# Patient Record
Sex: FEMALE | Race: WHITE | NOT HISPANIC OR LATINO | Employment: PART TIME | URBAN - METROPOLITAN AREA
[De-identification: names, ages, dates, MRNs, and addresses within clinical notes are randomized per-mention and may not be internally consistent; named-entity substitution may affect disease eponyms.]

---

## 2017-06-28 ENCOUNTER — OFFICE VISIT (OUTPATIENT)
Dept: PEDIATRICS | Facility: CLINIC | Age: 37
End: 2017-06-28
Payer: COMMERCIAL

## 2017-06-28 VITALS
TEMPERATURE: 97.8 F | HEIGHT: 65 IN | BODY MASS INDEX: 40.14 KG/M2 | SYSTOLIC BLOOD PRESSURE: 116 MMHG | DIASTOLIC BLOOD PRESSURE: 76 MMHG | OXYGEN SATURATION: 97 % | HEART RATE: 87 BPM | WEIGHT: 240.9 LBS

## 2017-06-28 DIAGNOSIS — M21.41 FLAT FEET, BILATERAL: ICD-10-CM

## 2017-06-28 DIAGNOSIS — M79.672 LEFT FOOT PAIN: ICD-10-CM

## 2017-06-28 DIAGNOSIS — L60.3 NAIL DYSTROPHY: ICD-10-CM

## 2017-06-28 DIAGNOSIS — M21.42 FLAT FEET, BILATERAL: ICD-10-CM

## 2017-06-28 DIAGNOSIS — L60.0 INGROWN NAIL: ICD-10-CM

## 2017-06-28 DIAGNOSIS — N92.0 EXCESSIVE OR FREQUENT MENSTRUATION: Primary | ICD-10-CM

## 2017-06-28 PROCEDURE — 99214 OFFICE O/P EST MOD 30 MIN: CPT | Performed by: FAMILY MEDICINE

## 2017-06-28 RX ORDER — VENLAFAXINE HYDROCHLORIDE 150 MG/1
150 CAPSULE, EXTENDED RELEASE ORAL DAILY
Refills: 0 | COMMUNITY
Start: 2017-05-01 | End: 2022-10-06

## 2017-06-28 ASSESSMENT — ANXIETY QUESTIONNAIRES
7. FEELING AFRAID AS IF SOMETHING AWFUL MIGHT HAPPEN: NOT AT ALL
5. BEING SO RESTLESS THAT IT IS HARD TO SIT STILL: NOT AT ALL
1. FEELING NERVOUS, ANXIOUS, OR ON EDGE: SEVERAL DAYS
2. NOT BEING ABLE TO STOP OR CONTROL WORRYING: NOT AT ALL
GAD7 TOTAL SCORE: 2
3. WORRYING TOO MUCH ABOUT DIFFERENT THINGS: NOT AT ALL
6. BECOMING EASILY ANNOYED OR IRRITABLE: SEVERAL DAYS

## 2017-06-28 ASSESSMENT — PAIN SCALES - GENERAL: PAINLEVEL: MILD PAIN (2)

## 2017-06-28 ASSESSMENT — PATIENT HEALTH QUESTIONNAIRE - PHQ9: 5. POOR APPETITE OR OVEREATING: NOT AT ALL

## 2017-06-28 NOTE — NURSING NOTE
"Chief Complaint   Patient presents with     Toenail     Left great toenail fungal problem     Contraception     Patient c/o heavy periods and hormonal emotions on IUD     Musculoskeletal Problem     Left side of foot pain x3-4 weeks. No known injury.        Initial /76  Pulse 87  Temp 97.8  F (36.6  C) (Oral)  Ht 5' 4.75\" (1.645 m)  Wt 240 lb 14.4 oz (109.3 kg)  LMP 06/25/2017 (Exact Date)  SpO2 97%  Breastfeeding? No  BMI 40.4 kg/m2 Estimated body mass index is 40.4 kg/(m^2) as calculated from the following:    Height as of this encounter: 5' 4.75\" (1.645 m).    Weight as of this encounter: 240 lb 14.4 oz (109.3 kg).  BP completed using cuff size: sade Alfredo, CMA    "

## 2017-06-28 NOTE — MR AVS SNAPSHOT
After Visit Summary   6/28/2017    Evan Dominguez    MRN: [id number]           Patient Information     Date Of Birth          1980        Visit Information        Provider Department      6/28/2017 6:00 PM Pablo Mcdowell MD [facility]        Today's Diagnoses     Excessive or frequent menstruation    -  1    Left foot pain        Nail dystrophy        Ingrown nail          Care Instructions    USE FOOT ORTHOTICS FOR ARCHES  USE WARM FOOT SOAKS DAILY, BACITRACIN TWICE DAILY FOR 2 WEEKS  SCHEDULE FOR F/U WITH Gyn for changing to Mirena IUD  Call for podiatry consult if foot pain is not any better in 4-6 weeks    Ingrown Toenail, Not Infected (Home Treatment)  An ingrown toenail occurs when the nail grows sideways into the skin next to the nail. This can cause pain, especially when wearing tight shoes. It can also lead to an infection with redness, swelling, and pus drainage. Most people respond to the treatments described here. Sometimes surgery is needed, however.  Home care  The following guidelines will help you care for your toenail at home:    Soak the painful toe in warm water twice a day for 10 to 20 minutes each time. Wash the entire foot with an antibacterial soap.    If there is redness or swelling around the toenail, apply an antibiotic ointment three times a day.    Insert a small piece of rolled-up cotton under the corner of the nail to promote growth of the nail outward, away from the cuticle.    Wear shoes that do not put pressure on the toes, such as a sandal or open shoe. Closed shoes should be big enough in the toes so that there is no pressure on the painful toe.    You may use acetaminophen or ibuprofen for pain, unless another pain medicine was prescribed. Talk with your healthcare provider before using these medicines if you have chronic liver or kidney disease. Also tell your healthcare provider if you have ever had a stomach ulcer or GI  bleeding.  Prevention  The following tips will help you prevent ingrown toenails:    Avoid pointed, tight, or narrow shoes.    Trim toenails once a month so they don t grow too long. Cut the nail straight across.  Follow-up care  Follow up with your healthcare provider or as advised.  When to seek medical advice  Call your health care provider right away if any of these occur:    Increasing redness, pain, or swelling of the toe    Tender red streaks in the skin leading toward the ankle    Pus or fluid drainage from the toe    Fever of 100.4 F (38 C) or higher  Date Last Reviewed: 5/14/2015 2000-2017 [employer]. [address] [place] [address]. All rights reserved. This information is not intended as a substitute for professional medical care. Always follow your healthcare professional's instructions.                Follow-ups after your visit        Additional Services     PODIATRY/FOOT & ANKLE SURGERY REFERRAL       Your provider has referred you to: [facility]    Please be aware that coverage of these services is subject to the terms and limitations of your health insurance plan.  Call member services at your health plan with any benefit or coverage questions.      Please bring the following to your appointment:  >>   Any x-rays, CTs or MRIs which have been performed.  Contact the facility where they were done to arrange for  prior to your scheduled appointment.    >>   List of current medications   >>   This referral request   >>   Any documents/labs given to you for this referral                  Who to contact     If you have questions or need follow up information about today's clinic visit or your schedule please contact [facility] directly at [phone].  Normal or non-critical lab and imaging results will be communicated to you by MyChart, letter or phone within 4 business days after the clinic has received the results. If you do not hear from us within 7  "days, please contact the clinic through [employer] or phone. If you have a critical or abnormal lab result, we will notify you by phone as soon as possible.  Submit refill requests through [employer] or call your pharmacy and they will forward the refill request to us. Please allow 3 business days for your refill to be completed.          Additional Information About Your Visit        [employer] Information     [employer] is an electronic gateway that provides easy, online access to your medical records. With [employer], you can request a clinic appointment, read your test results, renew a prescription or communicate with your care team.     To sign up for [employer] visit the website at [email or web address]   You will be asked to enter the access code listed below, as well as some personal information. Please follow the directions to create your username and password.     Your access code is: [email or web address]  Expires: 2017 12:26 PM     Your access code will  in 90 days. If you need help or a new code, please contact your [facility] or call [phone] for assistance.        Care EveryWhere ID     This is your Care EveryWhere ID. This could be used by other organizations to access your [place] medical records  [id number]        Your Vitals Were     Pulse Temperature Height Last Period Pulse Oximetry Breastfeeding?    87 97.8  F (36.6  C) (Oral) 5' 4.75\" (1.645 m) 2017 (Exact Date) 97% No    BMI (Body Mass Index)                   40.4 kg/m2            Blood Pressure from Last 3 Encounters:   17 116/76   16 124/70   12/03/15 117/67    Weight from Last 3 Encounters:   17 240 lb 14.4 oz (109.3 kg)   16 234 lb 6.4 oz (106.3 kg)   12/03/15 236 lb 3.2 oz (107.1 kg)              We Performed the Following     PODIATRY/FOOT & ANKLE SURGERY REFERRAL        Primary Care Provider Office Phone # Fax #    Pablo Mcdowell [phone]       [facility] " [facility] [id number]  [facility] [address]        Equal Access to Services     GUANAKITOKANA SHAHZAD : Hadii edward ibarra hadananyao Soricardoali, waaxda luqadaha, qaybta kaalmada felipefreedom, waxtamika armin porterdina wangchris shepherd ankit . So [facility] [phone].    ATENCIÓN: Si habla español, tiene a rogers disposición servicios gratuitos de asistencia lingüística. Llame al [phone].    We comply with applicable federal civil rights laws and [place] laws. We do not discriminate on the basis of race, color, national origin, age, disability sex, sexual orientation or gender identity.            Thank you!     Thank you for choosing [facility]  for your care. Our goal is always to provide you with excellent care. Hearing back from our patients is one way we can continue to improve our services. Please take a few minutes to complete the written survey that you may receive in the mail after your visit with us. Thank you!             Your Updated Medication List - Protect others around you: Learn how to safely use, store and throw away your medicines at www.disposemymeds.org.          This list is accurate as of: 6/28/17  6:14 PM.  Always use your most recent med list.                   Brand Name Dispense Instructions for use Diagnosis    albuterol 108 (90 BASE) MCG/ACT Inhaler    PROAIR HFA/PROVENTIL HFA/VENTOLIN HFA    1 Inhaler    Inhale 2 puffs into the lungs every 6 hours as needed for shortness of breath / dyspnea.    Intermittent asthma       olopatadine HCl 0.2 % Soln    PATADAY    1 Bottle    Place 1 drop into both eyes daily as needed (For itchy, water eyes.)    Allergic conjunctivitis, bilateral       paragard intrauterine copper     1 each    One device, intrauterine, for up to 10 years.    Encounter for IUD insertion       venlafaxine 150 MG 24 hr capsule    EFFEXOR-XR     Take 150 mg by mouth daily

## 2017-06-28 NOTE — PROGRESS NOTES
SUBJECTIVE:                                                    Evan Dominguez is a 37 year old female who presents to clinic today for the following health issues:            Joint Pain  Patient seen with concerns of having pain along the outer border of left foot and the heel for the past few months. Has history of plantar fasciitis in the past.  She denies right-sided symptoms, works as a RN at the [employer]    Onset: left foot pain for few months    Description:   Location: Outer border of left foot and heel  Character: Dull ache    Intensity: mild, moderate    Progression of Symptoms: intermittent    Accompanying Signs & Symptoms:  Other symptoms: none    History:   Previous similar pain: no       Precipitating factors:   Trauma or overuse: no     Alleviating factors:  Improved by: nothing    Therapies Tried and outcome: none    FOOT NAIL DYSTROPHY-complaining of pain around the left big toe With the discolored left big toenail for the past 1-2 years, has tried over-the-counter medications with no relief patient denies to swelling, right-sided symptoms  Vaginal Bleeding (Dysmenorrhea)  Onset: Patient is  2 para 2, has been having paragard IUD since 2015 for contraception. She had 2 C-sections for children before.  Patient did not have any cycles while on breast-feeding after the IUD for at least one year. Has been having irregular menstrual cycles for the past 6-7 months with periods lasting for 7-10 days with very heavy bleeding including clots for the first 3 days of the cycle where she had to use a tampon and a pad  Every 1-2 hours.        Problem list and histories reviewed & adjusted, as indicated.  Additional history: as documented    Patient Active Problem List   Diagnosis     CARDIOVASCULAR SCREENING; LDL GOAL LESS THAN 160     Asthma, exercise induced     H/O kidney donation     Radial styloid tenosynovitis     Moderate major depression (H)     Obesity (BMI 30-39.9)      Meibomian gland dysfunction     Allergic conjunctivitis     Family history of diabetes mellitus     Previous  delivery, antepartum condition or complication     Asymptomatic bacteriuria in pregnancy in second trimester     Need for Tdap vaccination     Beta hemolytic streptococcus urinary tract infection affecting pregnancy     Excessive or frequent menstruation     Flat feet, bilateral     Ingrown nail     Nail dystrophy     Past Surgical History:   Procedure Laterality Date     C NEPHRECTOMY      LT/Donated Kidney to mother     C NONSPECIFIC PROCEDURE Right 1997    Ganglion Cyst/RT Wrist     C NONSPECIFIC PROCEDURE      laparoscopic gastric banding     C/SECTION, LOW TRANSVERSE  2013    , Low Transverse     LAPAROSCOPIC CHOLECYSTECTOMY  2014    Procedure: LAPAROSCOPIC CHOLECYSTECTOMY;  Surgeon: Cyril Hill MD;  Location:  OR       Social History   Substance Use Topics     Smoking status: Never Smoker     Smokeless tobacco: Never Used     Alcohol use Yes      Comment:  1 drink a month before pregancy     Family History   Problem Relation Age of Onset     DIABETES Mother      Pre Diabetic     Thyroid Disease Mother      Hypertension Father      DIABETES Maternal Grandmother      HEART DISEASE Maternal Grandfather      DIABETES Paternal Grandmother      HEART DISEASE Paternal Grandfather      CANCER No family hx of      CEREBROVASCULAR DISEASE No family hx of      Glaucoma No family hx of      Macular Degeneration No family hx of          Current Outpatient Prescriptions   Medication Sig Dispense Refill     venlafaxine (EFFEXOR-XR) 150 MG 24 hr capsule Take 150 mg by mouth daily  0     olopatadine HCl (PATADAY) 0.2 % SOLN Place 1 drop into both eyes daily as needed (For itchy, water eyes.) 1 Bottle 6     paragard intrauterine copper One device, intrauterine, for up to 10 years. 1 each      albuterol (PROAIR HFA, PROVENTIL HFA, VENTOLIN HFA) 108 (90 BASE) MCG/ACT inhaler  "Inhale 2 puffs into the lungs every 6 hours as needed for shortness of breath / dyspnea. 1 Inhaler 1     Allergies   Allergen Reactions     Contrast Dye      Congestion and sneezing with IVP dye     Thimerosol [Thimerosal]      Swelling and redness with eye drops     Recent Labs   Lab Test  05/17/15   1019  07/18/14   0800  07/16/14   2220  07/08/13   0919  03/08/11   1311   LDL   --    --    --   116   --    HDL   --    --    --   47*   --    TRIG   --    --    --   128   --    ALT   --   39  25   --   12   CR  0.71  0.87  0.90   --   0.81   GFRESTIMATED  >90  Non  GFR Calc    75  72   --   82   GFRESTBLACK  >90   GFR Calc    >90  87   --   >90   POTASSIUM  4.1  4.0  4.3   --   4.1   TSH  1.04   --    --   1.70  2.44      BP Readings from Last 3 Encounters:   06/28/17 116/76   09/29/16 124/70   12/03/15 117/67    Wt Readings from Last 3 Encounters:   06/28/17 240 lb 14.4 oz (109.3 kg)   09/29/16 234 lb 6.4 oz (106.3 kg)   12/03/15 236 lb 3.2 oz (107.1 kg)                  Labs reviewed in EPIC    Reviewed and updated as needed this visit by clinical staff  Tobacco  Allergies  Meds  Med Hx  Surg Hx  Fam Hx  Soc Hx      Reviewed and updated as needed this visit by Provider         ROS:  C: NEGATIVE for fever, chills, change in weight  INTEGUMENTARY/SKIN: as above  R: NEGATIVE for significant cough or SOB  CV: NEGATIVE for chest pain, palpitations or peripheral edema  GI: NEGATIVE for nausea, abdominal pain, heartburn, or change in bowel habits  -as above  MUSCULOSKELETAL: as above  NEURO: NEGATIVE for weakness, dizziness or paresthesias  ENDOCRINE: NEGATIVE for temperature intolerance, skin/hair changes  HEME/ALLERGY/IMMUNE: NEGATIVE for bleeding problems  PSYCHIATRIC: NEGATIVE for changes in mood or affect    OBJECTIVE:     /76  Pulse 87  Temp 97.8  F (36.6  C) (Oral)  Ht 5' 4.75\" (1.645 m)  Wt 240 lb 14.4 oz (109.3 kg)  LMP 06/25/2017 (Exact Date)  SpO2 97%  " Breastfeeding? No  BMI 40.4 kg/m2  Body mass index is 40.4 kg/(m^2).  GENERAL: healthy, alert and no distress  ABDOMEN: soft, nontender, no hepatosplenomegaly, no masses and bowel sounds normal  MS: normal gait, significant flatfeet-bilateral  Minimal tenderness over the left heel, full range of motion of the left foot  SKIN: ingrown toenail,No significant subungual hypertrophy or discoloration of nail  BACK: no CVA tenderness, no paralumbar tenderness  PSYCH: mentation appears normal, affect normal/bright    Diagnostic Test Results:  none     ASSESSMENT/PLAN:             1. Excessive or frequent menstruation  Reviewed her normal pelvic ultrasound from September 2016.   Likely her current heavy mensescould be from ParaGard IUD.   Recommended patient to follow-up with Dr. Rios to consider switching to Mirena IUD/Discussion on permanent contraception including essure.  Patient is not wishing for more children,  does not want to have vasectomy.  Patient verbalised understanding and is agreeable to the plan.      2. Left foot pain  ddx- plantar fasciitis/foot tendinitis/From flat feet  Recommended  local ice and heat, avoid triggering activities, Recommended to start using over-the-counter foot orthotics, use good comfortable footwear tylenol or Ibuprofen prn for pain, Call to schedule for podiatry consult if symptoms are not relieved with conservative treatment in 4-6 weeks  Patient verbalised understanding and is agreeable to the plan.  - PODIATRY/FOOT & ANKLE SURGERY REFERRAL    3. Nail dystrophy  Reassured patient, continue to monitor  - PODIATRY/FOOT & ANKLE SURGERY REFERRAL    4. Ingrown nail    Soak tid.  Bacitracin to affected area tid.  If any worsening sx, fever, etc. should be seen.  Allow nail to grow out and cut straight across.    - PODIATRY/FOOT & ANKLE SURGERY REFERRAL    5. Flat feet, bilateral  as above        Chart documentation done in part with Dragon Voice recognition Software. Although  reviewed after completion, some word and grammatical error may remain.    See Patient Instructions    Pablo Mcdowell MD  [facility]

## 2017-06-28 NOTE — PATIENT INSTRUCTIONS
USE FOOT ORTHOTICS FOR ARCHES  USE WARM FOOT SOAKS DAILY, BACITRACIN TWICE DAILY FOR 2 WEEKS  SCHEDULE FOR F/U WITH Gyn for changing to Mirena IUD  Call for podiatry consult if foot pain is not any better in 4-6 weeks    Ingrown Toenail, Not Infected (Home Treatment)  An ingrown toenail occurs when the nail grows sideways into the skin next to the nail. This can cause pain, especially when wearing tight shoes. It can also lead to an infection with redness, swelling, and pus drainage. Most people respond to the treatments described here. Sometimes surgery is needed, however.  Home care  The following guidelines will help you care for your toenail at home:    Soak the painful toe in warm water twice a day for 10 to 20 minutes each time. Wash the entire foot with an antibacterial soap.    If there is redness or swelling around the toenail, apply an antibiotic ointment three times a day.    Insert a small piece of rolled-up cotton under the corner of the nail to promote growth of the nail outward, away from the cuticle.    Wear shoes that do not put pressure on the toes, such as a sandal or open shoe. Closed shoes should be big enough in the toes so that there is no pressure on the painful toe.    You may use acetaminophen or ibuprofen for pain, unless another pain medicine was prescribed. Talk with your healthcare provider before using these medicines if you have chronic liver or kidney disease. Also tell your healthcare provider if you have ever had a stomach ulcer or GI bleeding.  Prevention  The following tips will help you prevent ingrown toenails:    Avoid pointed, tight, or narrow shoes.    Trim toenails once a month so they don t grow too long. Cut the nail straight across.  Follow-up care  Follow up with your healthcare provider or as advised.  When to seek medical advice  Call your health care provider right away if any of these occur:    Increasing redness, pain, or swelling of the toe    Tender red streaks  in the skin leading toward the ankle    Pus or fluid drainage from the toe    Fever of 100.4 F (38 C) or higher  Date Last Reviewed: 5/14/2015 2000-2017 [employer]. [address] [place] [address], [place] [address]. All rights reserved. This information is not intended as a substitute for professional medical care. Always follow your healthcare professional's instructions.

## 2017-06-29 ASSESSMENT — ANXIETY QUESTIONNAIRES: GAD7 TOTAL SCORE: 2

## 2017-06-29 ASSESSMENT — PATIENT HEALTH QUESTIONNAIRE - PHQ9: SUM OF ALL RESPONSES TO PHQ QUESTIONS 1-9: 4

## 2017-06-29 ASSESSMENT — ASTHMA QUESTIONNAIRES: ACT_TOTALSCORE: 25

## 2017-08-29 ENCOUNTER — OFFICE VISIT (OUTPATIENT)
Dept: PODIATRY | Facility: CLINIC | Age: 37
End: 2017-08-29
Attending: FAMILY MEDICINE
Payer: COMMERCIAL

## 2017-08-29 VITALS — HEART RATE: 84 BPM | OXYGEN SATURATION: 97 % | SYSTOLIC BLOOD PRESSURE: 116 MMHG | DIASTOLIC BLOOD PRESSURE: 83 MMHG

## 2017-08-29 DIAGNOSIS — B35.1 DERMATOPHYTOSIS OF NAIL: Primary | ICD-10-CM

## 2017-08-29 DIAGNOSIS — M72.2 PLANTAR FASCIITIS OF LEFT FOOT: ICD-10-CM

## 2017-08-29 DIAGNOSIS — B35.3 TINEA PEDIS OF BOTH FEET: ICD-10-CM

## 2017-08-29 PROCEDURE — 99203 OFFICE O/P NEW LOW 30 MIN: CPT | Performed by: PODIATRIST

## 2017-08-29 RX ORDER — NYSTATIN AND TRIAMCINOLONE ACETONIDE 100000; 1 [USP'U]/G; MG/G
CREAM TOPICAL 2 TIMES DAILY
Qty: 60 G | Refills: 4 | Status: SHIPPED | OUTPATIENT
Start: 2017-08-29 | End: 2018-06-06

## 2017-08-29 NOTE — PROGRESS NOTES
Past Medical History:   Diagnosis Date     Abnormal Pap smear     resolved     Asthma     Mild;  extreme cold or heavy exercises     Asymptomatic bacteriuria in pregnancy in second trimester 2015    Beta strep isolated.      Depression      Migraine headache      Postpartum hypertension     no magnesium sulfate used.       Solitary kidney     S/P Donated, Left     Patient Active Problem List   Diagnosis     CARDIOVASCULAR SCREENING; LDL GOAL LESS THAN 160     Asthma, exercise induced     H/O kidney donation     Radial styloid tenosynovitis     Moderate major depression (H)     Obesity (BMI 30-39.9)     Meibomian gland dysfunction     Allergic conjunctivitis     Family history of diabetes mellitus     Previous  delivery, antepartum condition or complication     Asymptomatic bacteriuria in pregnancy in second trimester     Need for Tdap vaccination     Beta hemolytic streptococcus urinary tract infection affecting pregnancy     Excessive or frequent menstruation     Flat feet, bilateral     Ingrown nail     Nail dystrophy     Past Surgical History:   Procedure Laterality Date     C NEPHRECTOMY      LT/Donated Kidney to mother     C NONSPECIFIC PROCEDURE Right     Ganglion Cyst/RT Wrist     C NONSPECIFIC PROCEDURE      laparoscopic gastric banding     C/SECTION, LOW TRANSVERSE  2013    , Low Transverse     LAPAROSCOPIC CHOLECYSTECTOMY  2014    Procedure: LAPAROSCOPIC CHOLECYSTECTOMY;  Surgeon: Cyril Hill MD;  Location:  OR     Social History     Social History     Marital status:      Spouse name: Jeromy Mchugh     Number of children: 01     Years of education: 16     Occupational History     [employer]     Social History Main Topics     Smoking status: Never Smoker     Smokeless tobacco: Never Used     Alcohol use Yes      Comment:  1 drink a month before pregancy     Drug use: No     Sexual activity: Yes     Partners:  Male     Other Topics Concern      Service No     Blood Transfusions No     Caffeine Concern No     Occupational Exposure No     Hobby Hazards No     Sleep Concern No     Stress Concern No     Weight Concern Yes     Special Diet No     Back Care No     Exercise Yes     Bike Helmet Yes     Seat Belt Yes     Self-Exams Yes     Social History Narrative     Family History   Problem Relation Age of Onset     DIABETES Mother      Pre Diabetic     Thyroid Disease Mother      Hypertension Father      DIABETES Maternal Grandmother      HEART DISEASE Maternal Grandfather      DIABETES Paternal Grandmother      HEART DISEASE Paternal Grandfather      CANCER No family hx of      CEREBROVASCULAR DISEASE No family hx of      Glaucoma No family hx of      Macular Degeneration No family hx of      SUBJECTIVE FINDINGS:  37-year-old female presents from Dr. Mcdowell for left hallux nail.  She relates it has been bothering her for about a year.  She relates she used over-the-counter antifungal cream that did not help.  She saw her primary physician who thought maybe it was not fungal but bacterial.  She soaked it in antibiotic soap and used bacitracin, and that did not really help.  Relates she had some debris under it so she trimmed it out herself.  Relates it hurts intermittently if she bumps it. She also relates she has plantar fasciitis in the left foot.  She has used over-the-counter insoles, she does stretching, she has a roller and she has a night splint.  This helps, but she is getting shooting pain up the lateral heel now.  She does work on her feet.  Relates no specific injuries.      OBJECTIVE FINDINGS:  DP and PT are 2/4 bilaterally.   She has a flat foot type bilaterally.  She has dorsally contracted digits 2-5 bilaterally.  She has a lateral deviated hallux and dorsal medial first MPJ prominence bilaterally.  She has dry, scaly skin on the distal toes mostly but in a moccasin pattern bilaterally.  She has  hyperkeratotic tissue buildup plantar medial hallux bilaterally and heels bilaterally.  There is no erythema, no drainage, no odor, no calor bilaterally.  No gross tendon voids bilaterally.  She has pain on palpation of plantar left heel.  She relates she gets shooting pain up the lateral heel at times.  She has a left hallux nail.  The medial nail border has been removed.  There is some proximal nail remaining that is thick and discolored.  There is scaly, cracked skin around the nail and distal toes bilaterally.      ASSESSMENT/PLAN:  Tinea pedis bilaterally.  She has onychomycosis changes on medial nail border on the left.  Plantar fasciitis with neuritis left.  Diagnosis and treatment options discussed with patient.  Prescription for nystatin and triamcinolone cream given and use discussed with her.  She is given a prescription for custom-foot orthotics and use discussed with her.  She wanted to check insurance coverage on those.  Prescription for Voltaren gel given and use discussed with her.  She will return to clinic and see me in 2 weeks.

## 2017-08-29 NOTE — NURSING NOTE
"Evan Dominguez's goals for this visit include: Ingrown toenail left foot and plantar fasciitis  She requests these members of her care team be copied on today's visit information: yes    PCP: Pablo Mcdowell    Referring Provider:  Pablo Mcdowell MD  [address] [place] [address]    Chief Complaint   Patient presents with     RECHECK     Ingrown toenail left and plantar fasciitis       Initial /83  Pulse 84  SpO2 97% Estimated body mass index is 40.4 kg/(m^2) as calculated from the following:    Height as of 6/28/17: 1.645 m (5' 4.75\").    Weight as of 6/28/17: 109.3 kg (240 lb 14.4 oz).  Medication Reconciliation: complete    "

## 2017-08-29 NOTE — MR AVS SNAPSHOT
After Visit Summary   8/29/2017    Evan Dominguez    MRN: [id number]           Patient Information     Date Of Birth          1980        Visit Information        Provider Department      8/29/2017 8:00 AM Sid Mckenna DPM [facility]        Today's Diagnoses     Dermatophytosis of nail    -  1    Tinea pedis of both feet        Plantar fasciitis of left foot          Care Instructions    Thanks for coming today.  Ortho/Sports Medicine Clinic  [address] [place] [address]    To schedule future appointments in Ortho Clinic, you may call [phone].    To schedule ordered imaging by your Provider: Call [facility] at [phone]    [employer] available online at:   [email or web address]    Please call if any further questions or concerns [phone] and ask for the Orthopedic Department. Clinic hours 8 am to 5 pm.    Return to clinic if symptoms worsen.            Follow-ups after your visit        Additional Services     ORTHOTICS REFERRAL       *This referral order prints off in the [facility]  (Orthotics & Prosthetics) Central Scheduling Office**    The [facility] Central Scheduling Staff will contact the patient to schedule appointments.     Central Scheduling Contact Information: [phone] ([place])    Custom foot orthotics.      Please be aware that coverage of these services is subject to the terms and limitations of your health insurance plan.  Call member services at your health plan with any benefit or coverage questions.      Please bring the following to your appointment:    >>   Any x-rays, CTs or MRIs which have been performed.  Contact the facility where they were done to arrange for  prior to your scheduled appointment.    >>   List of current medications   >>   This referral request   >>   Any documents/labs given to you for this referral                  Your next 10 appointments already  scheduled     Sep 12, 2017  9:00 AM CDT   Return Visit with Sid Mckenna DPM   [facility] ([facility])    [address] [place] [address] [phone]              Who to contact     If you have questions or need follow up information about today's clinic visit or your schedule please contact [facility] directly at [phone].  Normal or non-critical lab and imaging results will be communicated to you by Dripplerhart, letter or phone within 4 business days after the clinic has received the results. If you do not hear from us within 7 days, please contact the clinic through Dripplerhart or phone. If you have a critical or abnormal lab result, we will notify you by phone as soon as possible.  Submit refill requests through Tagstr or call your pharmacy and they will forward the refill request to us. Please allow 3 business days for your refill to be completed.          Additional Information About Your Visit        Tagstr Information     Tagstr is an electronic gateway that provides easy, online access to your medical records. With Tagstr, you can request a clinic appointment, read your test results, renew a prescription or communicate with your care team.     To sign up for Tagstr visit the website at [email or web address]   You will be asked to enter the access code listed below, as well as some personal information. Please follow the directions to create your username and password.     Your access code is: [email or web address]  Expires: 2017 11:53 AM     Your access code will  in 90 days. If you need help or a new code, please contact your [facility] or call [phone] for assistance.        Care EveryWhere ID     This is your Care EveryWhere ID. This could be used by other organizations to access your [facility] medical records  [id number]        Your Vitals Were     Pulse Pulse Oximetry                 84 97%           Blood Pressure from Last 3 Encounters:   08/29/17 116/83   06/28/17 116/76   09/29/16 124/70    Weight from Last 3 Encounters:   06/28/17 109.3 kg (240 lb 14.4 oz)   09/29/16 106.3 kg (234 lb 6.4 oz)   12/03/15 107.1 kg (236 lb 3.2 oz)              We Performed the Following     ORTHOTICS REFERRAL          Today's Medication Changes          These changes are accurate as of: 8/29/17 11:53 AM.  If you have any questions, ask your nurse or doctor.               Start taking these medicines.        Dose/Directions    diclofenac 1 % Gel topical gel   Commonly known as:  VOLTAREN   Used for:  Plantar fasciitis of left foot        Apply 1 gram to left heel 2-3 times daily as needed.   Quantity:  100 g   Refills:  1       nystatin-triamcinolone cream   Commonly known as:  MYCOLOG II   Used for:  Dermatophytosis of nail, Tinea pedis of both feet        Apply topically 2 times daily To feet and toes.   Quantity:  60 g   Refills:  4            Where to get your medicines      These medications were sent to [place] [address] [place] [id number] [facility] [address]     Phone:  [phone]     diclofenac 1 % Gel topical gel    nystatin-triamcinolone cream                Primary Care Provider Office Phone # Fax #    Pablo Mcdowell [phone]       [address]  [facility] [address]        Equal Access to Services     [facility] Hospital[facility] AH: Hadii edward ibarra hadasho Soomaali, waaxda luqadaha, qaybta kaalmada adeegyada, gregory pimentel. So [facility] [phone].    ATENCIÓN: Si shilpala katelyn, tiene a rogers disposición servicios gratuitos de asistencia lingüística. Llame al [phone].    We comply with applicable federal civil rights laws and [place] laws. We do not discriminate on the basis of race, color, national origin, age, disability sex, sexual orientation or gender identity.            Thank you!     Thank you for choosing [facility]  for your care. Our goal is always to provide you with excellent care. Hearing back from our patients is one way we can continue to improve our services. Please take a few minutes to complete the written survey that you may receive in the mail after your visit with us. Thank you!             Your Updated Medication List - Protect others around you: Learn how to safely use, store and throw away your medicines at www.disposemymeds.org.          This list is accurate as of: 8/29/17 11:53 AM.  Always use your most recent med list.                   Brand Name Dispense Instructions for use Diagnosis    albuterol 108 (90 BASE) MCG/ACT Inhaler    PROAIR HFA/PROVENTIL HFA/VENTOLIN HFA    1 Inhaler    Inhale 2 puffs into the lungs every 6 hours as needed for shortness of breath / dyspnea.    Intermittent asthma       diclofenac 1 % Gel topical gel    VOLTAREN    100 g    Apply 1 gram to left heel 2-3 times daily as needed.    Plantar fasciitis of left foot       nystatin-triamcinolone cream    MYCOLOG II    60 g    Apply topically 2 times daily To feet and toes.    Dermatophytosis of nail, Tinea pedis of both feet       olopatadine HCl 0.2 % Soln    PATADAY    1 Bottle    Place 1 drop into both eyes daily as needed (For itchy, water eyes.)    Allergic conjunctivitis, bilateral       paragard intrauterine copper     1 each    One device, intrauterine, for up to 10 years.    Encounter for IUD insertion       venlafaxine 150 MG 24 hr capsule    EFFEXOR-XR     Take 150 mg by mouth daily

## 2017-08-29 NOTE — PATIENT INSTRUCTIONS
Thanks for coming today.  Ortho/Sports Medicine Clinic  [address] [place] [address]    To schedule future appointments in Ortho Clinic, you may call [phone].    To schedule ordered imaging by your Provider: Call [facility] at [phone]    joiz available online at:   [email or web address]    Please call if any further questions or concerns [phone] and ask for the Orthopedic Department. Clinic hours 8 am to 5 pm.    Return to clinic if symptoms worsen.

## 2017-09-12 ENCOUNTER — OFFICE VISIT (OUTPATIENT)
Dept: PODIATRY | Facility: CLINIC | Age: 37
End: 2017-09-12
Payer: COMMERCIAL

## 2017-09-12 VITALS — DIASTOLIC BLOOD PRESSURE: 74 MMHG | HEART RATE: 95 BPM | SYSTOLIC BLOOD PRESSURE: 118 MMHG | OXYGEN SATURATION: 98 %

## 2017-09-12 DIAGNOSIS — B35.1 DERMATOPHYTOSIS OF NAIL: ICD-10-CM

## 2017-09-12 DIAGNOSIS — B35.3 TINEA PEDIS OF BOTH FEET: Primary | ICD-10-CM

## 2017-09-12 PROCEDURE — 99213 OFFICE O/P EST LOW 20 MIN: CPT | Performed by: PODIATRIST

## 2017-09-12 RX ORDER — CICLOPIROX OLAMINE 7.7 MG/G
CREAM TOPICAL 2 TIMES DAILY
Qty: 90 G | Refills: 6 | Status: SHIPPED | OUTPATIENT
Start: 2017-09-12 | End: 2020-07-29

## 2017-09-12 NOTE — PATIENT INSTRUCTIONS
Thanks for coming today.  Ortho/Sports Medicine Clinic  [address] [place] [address]    To schedule future appointments in Ortho Clinic, you may call [phone].    To schedule ordered imaging by your Provider: Call [facility] at [phone]    [employer] available online at:   [email or web address]    Please call if any further questions or concerns [phone] and ask for the Orthopedic Department. Clinic hours 8 am to 5 pm.    Return to clinic if symptoms worsen.

## 2017-09-12 NOTE — MR AVS SNAPSHOT
After Visit Summary   9/12/2017    Evan Dominguez    MRN: [id number]           Patient Information     Date Of Birth          1980        Visit Information        Provider Department      9/12/2017 9:00 AM Sid Mckenna DPM [facility]        Today's Diagnoses     Tinea pedis of both feet    -  1    Dermatophytosis of nail          Care Instructions    Thanks for coming today.  Ortho/Sports Medicine Clinic  [address] [place] [address]    To schedule future appointments in Ortho Clinic, you may call [phone].    To schedule ordered imaging by your Provider: Call [facility] at [phone]    Yangaroo available online at:   [email or web address]    Please call if any further questions or concerns [phone] and ask for the Orthopedic Department. Clinic hours 8 am to 5 pm.    Return to clinic if symptoms worsen.            Follow-ups after your visit        Who to contact     If you have questions or need follow up information about today's clinic visit or your schedule please contact [facility] directly at [phone].  Normal or non-critical lab and imaging results will be communicated to you by Rx Networkhart, letter or phone within 4 business days after the clinic has received the results. If you do not hear from us within 7 days, please contact the clinic through [email or web address] or phone. If you have a critical or abnormal lab result, we will notify you by phone as soon as possible.  Submit refill requests through Yangaroo or call your pharmacy and they will forward the refill request to us. Please allow 3 business days for your refill to be completed.          Additional Information About Your Visit        MyChart Information     Yangaroo is an electronic gateway that provides easy, online access to your medical records. With Yangaroo, you can request a clinic appointment, read your test results, renew a prescription or communicate with  your care team.     To sign up for MyChart visit the website at [email or web address]   You will be asked to enter the access code listed below, as well as some personal information. Please follow the directions to create your username and password.     Your access code is: [email or web address]  Expires: 2017 11:53 AM     Your access code will  in 90 days. If you need help or a new code, please contact your [facility] or call [phone] for assistance.        Care EveryWhere ID     This is your Care EveryWhere ID. This could be used by other organizations to access your [facility] medical records  [id number]        Your Vitals Were     Pulse Pulse Oximetry                95 98%           Blood Pressure from Last 3 Encounters:   17 118/74   17 116/83   17 116/76    Weight from Last 3 Encounters:   17 109.3 kg (240 lb 14.4 oz)   16 106.3 kg (234 lb 6.4 oz)   12/03/15 107.1 kg (236 lb 3.2 oz)              Today, you had the following     No orders found for display         Today's Medication Changes          These changes are accurate as of: 17 11:59 PM.  If you have any questions, ask your nurse or doctor.               Start taking these medicines.        Dose/Directions    ciclopirox 0.77 % cream   Commonly known as:  LOPROX   Used for:  Tinea pedis of both feet, Dermatophytosis of nail        Apply topically 2 times daily To feet and affected toenail.   Quantity:  90 g   Refills:  6            Where to get your medicines      These medications were sent to [place] [address] [place] [facility] [address]  [facility] [address]     Phone:  [phone]     ciclopirox 0.77 % cream                Primary Care Provider Office Phone # Fax #    Pablo Mcdoewll [phone]       [address]  [facility] [address]        Equal Access to Services     ERIC PIKE AH: Estefania Cisneros  waaxda luqadaha, qaybta kaalmada jennifer, gregory cartagenaaaidna ah. So [facility] [phone].    ATENCIÓN: Si akin zepeda, tiene a rogers disposición servicios gratuitos de asistencia lingüística. Nimco al [phone].    We comply with applicable federal civil rights laws and [place] laws. We do not discriminate on the basis of race, color, national origin, age, disability sex, sexual orientation or gender identity.            Thank you!     Thank you for choosing [facility]  for your care. Our goal is always to provide you with excellent care. Hearing back from our patients is one way we can continue to improve our services. Please take a few minutes to complete the written survey that you may receive in the mail after your visit with us. Thank you!             Your Updated Medication List - Protect others around you: Learn how to safely use, store and throw away your medicines at www.disposemymeds.org.          This list is accurate as of: 9/12/17 11:59 PM.  Always use your most recent med list.                   Brand Name Dispense Instructions for use Diagnosis    albuterol 108 (90 BASE) MCG/ACT Inhaler    PROAIR HFA/PROVENTIL HFA/VENTOLIN HFA    1 Inhaler    Inhale 2 puffs into the lungs every 6 hours as needed for shortness of breath / dyspnea.    Intermittent asthma       ciclopirox 0.77 % cream    LOPROX    90 g    Apply topically 2 times daily To feet and affected toenail.    Tinea pedis of both feet, Dermatophytosis of nail       diclofenac 1 % Gel topical gel    VOLTAREN    100 g    Apply 1 gram to left heel 2-3 times daily as needed.    Plantar fasciitis of left foot       nystatin-triamcinolone cream    MYCOLOG II    60 g    Apply topically 2 times daily To feet and toes.    Dermatophytosis of nail, Tinea pedis of both feet       olopatadine HCl 0.2 % Soln    PATADAY    1 Bottle    Place 1 drop into both eyes daily as needed (For itchy, water eyes.)    Allergic  conjunctivitis, bilateral       paragard intrauterine copper     1 each    One device, intrauterine, for up to 10 years.    Encounter for IUD insertion       venlafaxine 150 MG 24 hr capsule    EFFEXOR-XR     Take 150 mg by mouth daily

## 2017-09-12 NOTE — NURSING NOTE
"Evan Dominguez's goals for this visit include: Ingrown toenail and foot pain check   She requests these members of her care team be copied on today's visit information: yes    PCP: Pablo Mcdowell    Referring Provider:  Pablo Mcdowell MD  [address] [place] [address]    Chief Complaint   Patient presents with     RECHECK     Ingrown Toenail       Initial /74  Pulse 95  SpO2 98% Estimated body mass index is 40.4 kg/(m^2) as calculated from the following:    Height as of 6/28/17: 1.645 m (5' 4.75\").    Weight as of 6/28/17: 109.3 kg (240 lb 14.4 oz).  Medication Reconciliation: complete    "

## 2017-09-12 NOTE — PROGRESS NOTES
Past Medical History:   Diagnosis Date     Abnormal Pap smear     resolved     Asthma     Mild;  extreme cold or heavy exercises     Asymptomatic bacteriuria in pregnancy in second trimester 2015    Beta strep isolated.      Depression      Migraine headache      Postpartum hypertension     no magnesium sulfate used.       Solitary kidney     S/P Donated, Left     Patient Active Problem List   Diagnosis     CARDIOVASCULAR SCREENING; LDL GOAL LESS THAN 160     Asthma, exercise induced     H/O kidney donation     Radial styloid tenosynovitis     Moderate major depression (H)     Obesity (BMI 30-39.9)     Meibomian gland dysfunction     Allergic conjunctivitis     Family history of diabetes mellitus     Previous  delivery, antepartum condition or complication     Asymptomatic bacteriuria in pregnancy in second trimester     Need for Tdap vaccination     Beta hemolytic streptococcus urinary tract infection affecting pregnancy     Excessive or frequent menstruation     Flat feet, bilateral     Ingrown nail     Nail dystrophy     Past Surgical History:   Procedure Laterality Date     C NEPHRECTOMY      LT/Donated Kidney to mother     C NONSPECIFIC PROCEDURE Right     Ganglion Cyst/RT Wrist     C NONSPECIFIC PROCEDURE      laparoscopic gastric banding     C/SECTION, LOW TRANSVERSE  2013    , Low Transverse     LAPAROSCOPIC CHOLECYSTECTOMY  2014    Procedure: LAPAROSCOPIC CHOLECYSTECTOMY;  Surgeon: Cyril Hill MD;  Location:  OR     Social History     Social History     Marital status:      Spouse name: Jeromy Mchugh     Number of children: 01     Years of education: 16     Occupational History     [employer]     Social History Main Topics     Smoking status: Never Smoker     Smokeless tobacco: Never Used     Alcohol use Yes      Comment:  1 drink a month before pregancy     Drug use: No     Sexual activity: Yes     Partners:  Male     Other Topics Concern      Service No     Blood Transfusions No     Caffeine Concern No     Occupational Exposure No     Hobby Hazards No     Sleep Concern No     Stress Concern No     Weight Concern Yes     Special Diet No     Back Care No     Exercise Yes     Bike Helmet Yes     Seat Belt Yes     Self-Exams Yes     Social History Narrative     Family History   Problem Relation Age of Onset     DIABETES Mother      Pre Diabetic     Thyroid Disease Mother      Hypertension Father      DIABETES Maternal Grandmother      HEART DISEASE Maternal Grandfather      DIABETES Paternal Grandmother      HEART DISEASE Paternal Grandfather      CANCER No family hx of      CEREBROVASCULAR DISEASE No family hx of      Glaucoma No family hx of      Macular Degeneration No family hx of      SUBJECTIVE:  A 37-year-old female returns to clinic for tinea pedis and onychomycosis.  She relates it is doing better.  She is using the triamcinolone, Nystatin cream.  She has maybe got a little bit of one tube left of that.  She relates she did end up going to get orthotics at [facility].  She has been using the Voltaren gel; that helps as well.      OBJECTIVE:  Skin is dry and intact bilaterally.  There is minimal dry scaly skin in a moccasin pattern.  There is no erythema, no drainage, no odor, no calor bilaterally.      ASSESSMENT AND PLAN:  Tinea pedis bilaterally.  Onychomycosis, left hallux nail, medial nail border.  Plantar fasciitis and neuritis.  Diagnosis and treatment options discussed with her.  Continue the Voltaren gel as needed.  She will finish the Nystatin/triamcinolone cream and then I will start her on Loprox cream.  She will use that on the tinea pedis until it is completely resolved and then on the toenail for up to a year or until it resolves.  Return to clinic and see me as needed.

## 2018-06-06 ENCOUNTER — OFFICE VISIT (OUTPATIENT)
Dept: PEDIATRICS | Facility: CLINIC | Age: 38
End: 2018-06-06
Payer: COMMERCIAL

## 2018-06-06 VITALS
BODY MASS INDEX: 40.67 KG/M2 | DIASTOLIC BLOOD PRESSURE: 75 MMHG | OXYGEN SATURATION: 100 % | HEIGHT: 65 IN | SYSTOLIC BLOOD PRESSURE: 116 MMHG | HEART RATE: 88 BPM | TEMPERATURE: 98.1 F | WEIGHT: 244.1 LBS

## 2018-06-06 DIAGNOSIS — J45.990 ASTHMA, EXERCISE INDUCED: ICD-10-CM

## 2018-06-06 DIAGNOSIS — R20.0 BILATERAL HAND NUMBNESS: ICD-10-CM

## 2018-06-06 DIAGNOSIS — Z13.29 THYROID DISORDER SCREEN: ICD-10-CM

## 2018-06-06 DIAGNOSIS — E66.01 MORBID OBESITY WITH BMI OF 40.0-44.9, ADULT (H): ICD-10-CM

## 2018-06-06 DIAGNOSIS — Z90.5 S/P NEPHRECTOMY: ICD-10-CM

## 2018-06-06 DIAGNOSIS — G56.03 BILATERAL CARPAL TUNNEL SYNDROME: ICD-10-CM

## 2018-06-06 DIAGNOSIS — Z13.6 CARDIOVASCULAR SCREENING; LDL GOAL LESS THAN 160: ICD-10-CM

## 2018-06-06 DIAGNOSIS — Z13.1 SCREENING FOR DIABETES MELLITUS (DM): ICD-10-CM

## 2018-06-06 DIAGNOSIS — T50.905S ADVERSE EFFECT OF DRUG, SEQUELA: ICD-10-CM

## 2018-06-06 DIAGNOSIS — Z13.0 SCREENING FOR DEFICIENCY ANEMIA: ICD-10-CM

## 2018-06-06 DIAGNOSIS — F32.1 MODERATE MAJOR DEPRESSION (H): ICD-10-CM

## 2018-06-06 DIAGNOSIS — Z00.01 ENCOUNTER FOR GENERAL ADULT MEDICAL EXAMINATION WITH ABNORMAL FINDINGS: Primary | ICD-10-CM

## 2018-06-06 DIAGNOSIS — S56.912A ELBOW STRAIN, LEFT, INITIAL ENCOUNTER: ICD-10-CM

## 2018-06-06 DIAGNOSIS — D23.9 DERMATOFIBROMA: ICD-10-CM

## 2018-06-06 DIAGNOSIS — Z12.4 SCREENING FOR CERVICAL CANCER: ICD-10-CM

## 2018-06-06 PROCEDURE — 99395 PREV VISIT EST AGE 18-39: CPT | Performed by: FAMILY MEDICINE

## 2018-06-06 PROCEDURE — G0145 SCR C/V CYTO,THINLAYER,RESCR: HCPCS | Performed by: FAMILY MEDICINE

## 2018-06-06 PROCEDURE — 87624 HPV HI-RISK TYP POOLED RSLT: CPT | Performed by: FAMILY MEDICINE

## 2018-06-06 PROCEDURE — 99214 OFFICE O/P EST MOD 30 MIN: CPT | Mod: 25 | Performed by: FAMILY MEDICINE

## 2018-06-06 RX ORDER — LAMOTRIGINE 100 MG/1
100 TABLET ORAL EVERY MORNING
Refills: 1 | COMMUNITY
Start: 2018-04-25

## 2018-06-06 ASSESSMENT — ANXIETY QUESTIONNAIRES
7. FEELING AFRAID AS IF SOMETHING AWFUL MIGHT HAPPEN: NOT AT ALL
6. BECOMING EASILY ANNOYED OR IRRITABLE: MORE THAN HALF THE DAYS
3. WORRYING TOO MUCH ABOUT DIFFERENT THINGS: SEVERAL DAYS
5. BEING SO RESTLESS THAT IT IS HARD TO SIT STILL: NOT AT ALL
2. NOT BEING ABLE TO STOP OR CONTROL WORRYING: NOT AT ALL
GAD7 TOTAL SCORE: 4
1. FEELING NERVOUS, ANXIOUS, OR ON EDGE: SEVERAL DAYS

## 2018-06-06 ASSESSMENT — PAIN SCALES - GENERAL: PAINLEVEL: MILD PAIN (2)

## 2018-06-06 ASSESSMENT — PATIENT HEALTH QUESTIONNAIRE - PHQ9: 5. POOR APPETITE OR OVEREATING: NOT AT ALL

## 2018-06-06 NOTE — PROGRESS NOTES
SUBJECTIVE:   CC: Evan Dominguez is an 38 year old woman who presents for preventive health visit.     Healthy Habits:  Patient with past medical history significant for mild intermittent asthma, depression, anxiety, morbid obesity is here for annual physical and with other concerns as mentioned below.    LEFT ELBOW PAIN-complaining of pain over the left elbow after straining the elbow while doing yard work for the past few weeks.    Patient denies history of fall or direct trauma to the elbow.    She is right-handed, denies previous similar symptoms, denies left elbow swelling.  BILATERAL HAND NUMBNESS-complaining of intermittent numbness and tingling of bilateral fingers associated with minimal wrist pain on both sides for the past few months.    Patient thought it was carpal tunnel syndrome and started using wrist splints which helped somewhat she denies current concerns for wrist pain, swelling.    Patient has 2 young children at home 5 years and one half years old who needs a lot of picking up.  Patient also works as a neonatology RN.  She denies history of anemia, thyroid disorder previous similar symptoms.,    Patient denies hand weakness,Neck or shoulder pain.  H/O DYSTONIA-patient is currently on Lamictal and Effexor for anxiety and depression, has started seeing a nurse practitioner n Psychiatry at [facility].  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  Patient denies further episodes after the initial incident.  She denies any current concerns.            Do you get at least three servings of calcium containing foods daily (dairy, green leafy vegetables, etc.)? yes    Amount of exercise or daily activities, outside of work: 2-4 day(s) per week    Problems taking medications regularly  No    Medication side effects: No    Have you had an eye exam in the past two years? no    Do you see a dentist twice per year? yes    Do you have sleep apnea, excessive snoring or daytime drowsiness?no      Asthma Follow-Up    Was ACT completed today?    Yes    ACT Total Scores 6/6/2018   ACT TOTAL SCORE -   ASTHMA ER VISITS -   ASTHMA HOSPITALIZATIONS -   ACT TOTAL SCORE (Goal Greater than or Equal to 20) 24   In the past 12 months, how many times did you visit the emergency room for your asthma without being admitted to the hospital? 0   In the past 12 months, how many times were you hospitalized overnight because of your asthma? 0       Recent asthma triggers that patient is dealing with: upper respiratory infections        Today's PHQ-2 Score:   PHQ-2 ( 1999 Pfizer) 6/6/2018 6/28/2017   Q1: Little interest or pleasure in doing things 0 0   Q2: Feeling down, depressed or hopeless 0 0   PHQ-2 Score 0 0       Abuse: Current or Past(Physical, Sexual or Emotional)- No  Do you feel safe in your environment - Yes    Social History   Substance Use Topics     Smoking status: Never Smoker     Smokeless tobacco: Never Used     Alcohol use Yes      Comment:  1 drink a month before pregancy     If you drink alcohol do you typically have >3 drinks per day or >7 drinks per week? No                     Reviewed orders with patient.  Reviewed health maintenance and updated orders accordingly - Yes  Labs reviewed in EPIC  BP Readings from Last 3 Encounters:   06/06/18 116/75   09/12/17 118/74   08/29/17 116/83    Wt Readings from Last 3 Encounters:   06/06/18 244 lb 1.6 oz (110.7 kg)   06/28/17 240 lb 14.4 oz (109.3 kg)   09/29/16 234 lb 6.4 oz (106.3 kg)                  Patient Active Problem List   Diagnosis     CARDIOVASCULAR SCREENING; LDL GOAL LESS THAN 160     Asthma, exercise induced     H/O kidney donation     Radial styloid tenosynovitis     Moderate major depression (H)     Obesity (BMI 30-39.9)     Meibomian gland  dysfunction     Allergic conjunctivitis     Family history of diabetes mellitus     Previous  delivery, antepartum condition or complication     Asymptomatic bacteriuria in pregnancy in second trimester     Need for Tdap vaccination     Beta hemolytic streptococcus urinary tract infection affecting pregnancy     Excessive or frequent menstruation     Flat feet, bilateral     Ingrown nail     Nail dystrophy     Bilateral carpal tunnel syndrome     Bilateral hand numbness     Past Surgical History:   Procedure Laterality Date     C NEPHRECTOMY      LT/Donated Kidney to mother     C NONSPECIFIC PROCEDURE Right 1997    Ganglion Cyst/RT Wrist     C NONSPECIFIC PROCEDURE      laparoscopic gastric banding     C/SECTION, LOW TRANSVERSE  2013    , Low Transverse     LAPAROSCOPIC CHOLECYSTECTOMY  2014    Procedure: LAPAROSCOPIC CHOLECYSTECTOMY;  Surgeon: Cyril Hill MD;  Location:  OR       Social History   Substance Use Topics     Smoking status: Never Smoker     Smokeless tobacco: Never Used     Alcohol use Yes      Comment:  1 drink a month before pregancy     Family History   Problem Relation Age of Onset     DIABETES Mother      Pre Diabetic     Thyroid Disease Mother      Hypertension Father      DIABETES Maternal Grandmother      HEART DISEASE Maternal Grandfather      DIABETES Paternal Grandmother      HEART DISEASE Paternal Grandfather      CANCER No family hx of      CEREBROVASCULAR DISEASE No family hx of      Glaucoma No family hx of      Macular Degeneration No family hx of          Current Outpatient Prescriptions   Medication Sig Dispense Refill     albuterol (PROAIR HFA, PROVENTIL HFA, VENTOLIN HFA) 108 (90 BASE) MCG/ACT inhaler Inhale 2 puffs into the lungs every 6 hours as needed for shortness of breath / dyspnea. 1 Inhaler 1     ciclopirox (LOPROX) 0.77 % cream Apply topically 2 times daily To feet and affected toenail. 90 g 6     lamoTRIgine (LAMICTAL) 100 MG  tablet Take 100 mg by mouth every morning  1     olopatadine HCl (PATADAY) 0.2 % SOLN Place 1 drop into both eyes daily as needed (For itchy, water eyes.) 1 Bottle 6     paragard intrauterine copper One device, intrauterine, for up to 10 years. 1 each      venlafaxine (EFFEXOR-XR) 150 MG 24 hr capsule Take 150 mg by mouth daily  0     Allergies   Allergen Reactions     Contrast Dye      Congestion and sneezing with IVP dye     Thimerosol [Thimerosal]      Swelling and redness with eye drops     Recent Labs   Lab Test  05/17/15   1019  07/18/14   0800  07/16/14   2220  07/08/13   0919  03/08/11   1311   LDL   --    --    --   116   --    HDL   --    --    --   47*   --    TRIG   --    --    --   128   --    ALT   --   39  25   --   12   CR  0.71  0.87  0.90   --   0.81   GFRESTIMATED  >90  Non  GFR Calc    75  72   --   82   GFRESTBLACK  >90   GFR Calc    >90  87   --   >90   POTASSIUM  4.1  4.0  4.3   --   4.1   TSH  1.04   --    --   1.70  2.44        Mammogram not appropriate for this patient based on age.    Pertinent mammograms are reviewed under the imaging tab.  History of abnormal Pap smear: NO - age 30- 65 PAP every 3 years recommended    Reviewed and updated as needed this visit by clinical staff  Tobacco  Allergies  Meds  Med Hx  Surg Hx  Fam Hx  Soc Hx        Reviewed and updated as needed this visit by Provider          Past Medical History:   Diagnosis Date     Abnormal Pap smear 2006    resolved     Asthma     Mild;  extreme cold or heavy exercises     Asymptomatic bacteriuria in pregnancy in second trimester 4/16/2015    Beta strep isolated.      Depression 2000     Migraine headache      Postpartum hypertension 2013    no magnesium sulfate used.       Solitary kidney 2000    S/P Donated, Left      Past Surgical History:   Procedure Laterality Date     C NEPHRECTOMY  2000    LT/Donated Kidney to mother     C NONSPECIFIC PROCEDURE Right 1997    Ganglion Cyst/RT  "Wrist     C NONSPECIFIC PROCEDURE      laparoscopic gastric banding     C/SECTION, LOW TRANSVERSE  2013    , Low Transverse     LAPAROSCOPIC CHOLECYSTECTOMY  2014    Procedure: LAPAROSCOPIC CHOLECYSTECTOMY;  Surgeon: Cyril Hill MD;  Location: [facility] OR     Obstetric History       T2      L2     SAB0   TAB0   Ectopic0   Multiple0   Live Births2       # Outcome Date GA Lbr Jose/2nd Weight Sex Delivery Anes PTL Lv   2 Term 09/17/15 38w3d  6 lb 15 oz (3.147 kg) M CS-LTranv   FREDERICK      Name: Samy New Term 13 38w5d  7 lb 10 oz (3.459 kg) M CS-LTranv EPI Y LIVE BIRTH      Complications: Oligohydramnios,Postpartum hypertension,Breech presentation          ROS:  CONSTITUTIONAL: NEGATIVE for fever, chills, change in weight  INTEGUMENTARY/SKIN: Skin lesion over the right knee That has been painful for the past few weeks with no concern for trauma, drainage, bleeding  EYES: NEGATIVE for vision changes or irritation  ENT: NEGATIVE for ear, mouth and throat problems  RESP: NEGATIVE for significant cough or SOB  BREAST: NEGATIVE for masses, tenderness or discharge  CV: NEGATIVE for chest pain, palpitations or peripheral edema  GI: NEGATIVE for nausea, abdominal pain, heartburn, or change in bowel habits  : NEGATIVE for unusual urinary or vaginal symptoms. Periods are regular.  MUSCULOSKELETAL:as above  NEURO: as above  ENDOCRINE: NEGATIVE for temperature intolerance, skin/hair changes  HEME/ALLERGY/IMMUNE: NEGATIVE for bleeding problems  PSYCHIATRIC: NEGATIVE for changes in mood or affect  PSYCHIATRIC: History of depression and anxiety    OBJECTIVE:   /75 (BP Location: Right arm, Patient Position: Sitting, Cuff Size: Adult Large)  Pulse 88  Temp 98.1  F (36.7  C) (Oral)  Ht 5' 5.25\" (1.657 m)  Wt 244 lb 1.6 oz (110.7 kg)  LMP 2018 (Exact Date)  SpO2 100%  BMI 40.31 kg/m2  EXAM:  GENERAL: healthy, alert, no distress and obese  EYES: Eyes grossly normal to " inspection, PERRL and conjunctivae and sclerae normal  HENT: ear canals and TM's normal, nose and mouth without ulcers or lesions  NECK: no adenopathy, no asymmetry, masses, or scars and thyroid normal to palpation  RESP: lungs clear to auscultation - no rales, rhonchi or wheezes  BREAST: normal without masses, tenderness or nipple discharge and no palpable axillary masses or adenopathy  CV: regular rate and rhythm, normal S1 S2, no S3 or S4, no murmur, click or rub, no peripheral edema and peripheral pulses strong  ABDOMEN: soft, nontender, no hepatosplenomegaly, no masses and bowel sounds normal   (female): normal female external genitalia, normal urethral meatus, vaginal mucosa pink, moist, well rugated, and normal cervix/adnexa/uterus without masses or discharge   (female): normal female external genitalia, normal urethral meatus , vaginal mucosa pink, moist, well rugated, normal cervix, adnexae, and uterus without masses. and ParaGard IUD seen at these external cervical office  MS: Left elbow-minimal tenderness over the lateral epicondyle  No elbow effusion, no warmth or redness noted, full range of motion of the left elbow  SKIN: 4 mm, slightly pigmented, raised cystic skin lesion over the skin of right anterior knee  NEURO: Normal strength and tone, mentation intact and speech normal  NEURO: Positive Tinel sign and Phalen's test  PSYCH: mentation appears normal, affect normal/bright    ASSESSMENT/PLAN:   1. Encounter for general adult medical examination with abnormal findings  Discussed on regular exercises, daily calcium intake, healthy eating, self breast exams monthly and routine dental checks    - Pap imaged thin layer screen with HPV - recommended age 30 - 65 years (select HPV order below)  - HPV High Risk Types DNA Cervical  - CBC with platelets differential; Future  - **Comprehensive metabolic panel FUTURE anytime; Future  - Lipid panel reflex to direct LDL Fasting; Future  - **TSH with free T4  reflex FUTURE 2mo; Future    2. Dermatofibroma  Right knee, recommended dermatology consult for surgical excision  - DERMATOLOGY REFERRAL    3. Elbow strain, left, initial encounter  Likely aggravating her carpal tunnel syndrome on the left side.  Recommended  local ice and heat, avoid triggering activities,  start on physical therapy for the left elbow tylenol or Ibuprofen prn for pain and rtc ofr persistent or worsening concerns in 4weeks.    - [facility] PT, HAND, AND CHIROPRACTIC REFERRAL    4. Bilateral hand numbness  ddx-carpal tunnel syndrome  Recommended to start on physical therapy, recommended to start waiting carpal tunnel splints during the day and night if possible, avoid triggering activities.  Emphasized on weight loss, screen for thyroid disorder  If symptoms are not any better in 6-8 weeks of PT, consider consulting orthopedic physician for further evaluation.  Patient verbalised understanding and is agreeable to the plan.    - [facility] PT, HAND, AND CHIROPRACTIC REFERRAL    5. Bilateral carpal tunnel syndrome  as above    - [facility] PT, HAND, AND CHIROPRACTIC REFERRAL    6. Adverse effect of drug, sequela, h/o dystonia  Patient's chart was updated with history of dystonia she requested.  Continue to follow-up with psychiatric nurse practitioner at [facility]    7. Screening for cervical cancer    - Pap imaged thin layer screen with HPV - recommended age 30 - 65 years (select HPV order below)  - HPV High Risk Types DNA Cervical    8. CARDIOVASCULAR SCREENING; LDL GOAL LESS THAN 160    - Lipid panel reflex to direct LDL Fasting; Future    9. Moderate major depression (H)  Stable, on Effexor and Lamictal continue psychiatric follow-up.  - DEPRESSION ACTION PLAN (DAP)    10. Morbid obesity with BMI of 40.0-44.9, adult (H)  Wt Readings from Last 5 Encounters:   06/06/18 244 lb 1.6 oz (110.7 kg)   06/28/17 240 lb 14.4 oz (109.3 kg)   09/29/16 234 lb 6.4 oz (106.3 kg)   12/03/15 236 lb 3.2 oz (107.1 kg)   11/05/15  "232 lb 6.4 oz (105.4 kg)     Emphasized on weight loss, portion control, low calorie and low fat diet, healthy eating, regular exercises.    - **Comprehensive metabolic panel FUTURE anytime; Future  - Lipid panel reflex to direct LDL Fasting; Future  - **TSH with free T4 reflex FUTURE 2mo; Future        11. Asthma, exercise induced    - Asthma Action Plan (AAP)    12. S/p nephrectomy, kidney donor  - **Comprehensive metabolic panel FUTURE anytime; Future    13. Screening for deficiency anemia    - CBC with platelets differential; Future    14. Screening for diabetes mellitus (DM)    - **Comprehensive metabolic panel FUTURE anytime; Future    15. Thyroid disorder screen    - **TSH with free T4 reflex FUTURE 2mo; Future    COUNSELING:   Reviewed preventive health counseling, as reflected in patient instructions  Special attention given to:        Regular exercise       Healthy diet/nutrition       Vision screening       Contraception       Family planning       Folic Acid Counseling         reports that she has never smoked. She has never used smokeless tobacco.    Estimated body mass index is 40.31 kg/(m^2) as calculated from the following:    Height as of this encounter: 5' 5.25\" (1.657 m).    Weight as of this encounter: 244 lb 1.6 oz (110.7 kg).   Weight management plan: Discussed healthy diet and exercise guidelines and patient will follow up in 12 months in clinic to re-evaluate.    Counseling Resources:  ATP IV Guidelines  Pooled Cohorts Equation Calculator  Breast Cancer Risk Calculator  FRAX Risk Assessment  ICSI Preventive Guidelines  Dietary Guidelines for Americans, 2010  USDA's MyPlate  ASA Prophylaxis  Lung CA Screening    Pablo Mcdowell MD  [facility]  Chart documentation done in part with Dragon Voice recognition Software. Although reviewed after completion, some word and grammatical error may remain.      "

## 2018-06-06 NOTE — PATIENT INSTRUCTIONS
Schedule for fasting labs  Schedule for skin consult  Start on PT for left elbow and carpal tunnel syndrome  Start wearing the splints during the day too      Preventive Health Recommendations  Female Ages 26 - 39  Yearly exam:   See your health care provider every year in order to    Review health changes.     Discuss preventive care.      Review your medicines if you your doctor has prescribed any.    Until age 30: Get a Pap test every three years (more often if you have had an abnormal result).    After age 30: Talk to your doctor about whether you should have a Pap test every 3 years or have a Pap test with HPV screening every 5 years.   You do not need a Pap test if your uterus was removed (hysterectomy) and you have not had cancer.  You should be tested each year for STDs (sexually transmitted diseases), if you're at risk.   Talk to your provider about how often to have your cholesterol checked.  If you are at risk for diabetes, you should have a diabetes test (fasting glucose).  Shots: Get a flu shot each year. Get a tetanus shot every 10 years.   Nutrition:     Eat at least 5 servings of fruits and vegetables each day.    Eat whole-grain bread, whole-wheat pasta and brown rice instead of white grains and rice.    Talk to your provider about Calcium and Vitamin D.     Lifestyle    Exercise at least 150 minutes a week (30 minutes a day, 5 days of the week). This will help you control your weight and prevent disease.    Limit alcohol to one drink per day.    No smoking.     Wear sunscreen to prevent skin cancer.    See your dentist every six months for an exam and cleaning.

## 2018-06-06 NOTE — MR AVS SNAPSHOT
After Visit Summary   6/6/2018    Evan Dominguze    MRN: [id number]           Patient Information     Date Of Birth          1980        Visit Information        Provider Department      6/6/2018 1:50 PM Pablo Mcdowell MD [facility]        Today's Diagnoses     Encounter for general adult medical examination with abnormal findings    -  1    Dermatofibroma        Elbow strain, left, initial encounter        Bilateral hand numbness        Bilateral carpal tunnel syndrome        Adverse effect of drug, sequela, h/o dystonia        Screening for cervical cancer          Care Instructions    Schedule for fasting labs  Schedule for skin consult  Start on PT for left elbow and carpal tunnel syndrome  Start wearing the splints during the day too      Preventive Health Recommendations  Female Ages 26 - 39  Yearly exam:   See your health care provider every year in order to    Review health changes.     Discuss preventive care.      Review your medicines if you your doctor has prescribed any.    Until age 30: Get a Pap test every three years (more often if you have had an abnormal result).    After age 30: Talk to your doctor about whether you should have a Pap test every 3 years or have a Pap test with HPV screening every 5 years.   You do not need a Pap test if your uterus was removed (hysterectomy) and you have not had cancer.  You should be tested each year for STDs (sexually transmitted diseases), if you're at risk.   Talk to your provider about how often to have your cholesterol checked.  If you are at risk for diabetes, you should have a diabetes test (fasting glucose).  Shots: Get a flu shot each year. Get a tetanus shot every 10 years.   Nutrition:     Eat at least 5 servings of fruits and vegetables each day.    Eat whole-grain bread, whole-wheat pasta and brown rice instead of white grains and rice.    Talk to your provider about Calcium and Vitamin D.      Lifestyle    Exercise at least 150 minutes a week (30 minutes a day, 5 days of the week). This will help you control your weight and prevent disease.    Limit alcohol to one drink per day.    No smoking.     Wear sunscreen to prevent skin cancer.    See your dentist every six months for an exam and cleaning.            Follow-ups after your visit        Additional Services     DERMATOLOGY REFERRAL       Your provider has referred you to: [facility]: [facility] [phone]   [email or web address]    Please be aware that coverage of these services is subject to the terms and limitations of your health insurance plan.  Call member services at your health plan with any benefit or coverage questions.      Please bring the following with you to your appointment:    (1) Any X-Rays, CTs or MRIs which have been performed.  Contact the facility where they were done to arrange for  prior to your scheduled appointment.    (2) List of current medications  (3) This referral request   (4) Any documents/labs given to you for this referral            [facility] PT, HAND, AND CHIROPRACTIC REFERRAL       **This order will print in the [facility] Scheduling Office**    Physical Therapy, Hand Therapy and Chiropractic Care are available through:    *[facility]  *[facility]  *[facility]    Call one number to schedule at any of the above locations: [phone].    Your provider has referred you to: Physical Therapy at [facility] or [facility]    Indication/Reason for Referral: as below  Onset of Illness: months  Therapy Orders: Evaluate and Treat  Special Programs: None  Special Request: None    Madai Aj      Additional Comments for the Therapist or Chiropractor: none    Please be aware that coverage of these services is subject to the terms and limitations of your health insurance plan.  Call member services at  "your health plan with any benefit or coverage questions.      Please bring the following to your appointment:    *Your personal calendar for scheduling future appointments  *Comfortable clothing                  Who to contact     If you have questions or need follow up information about today's clinic visit or your schedule please contact [facility] directly at [phone].  Normal or non-critical lab and imaging results will be communicated to you by MyChart, letter or phone within 4 business days after the clinic has received the results. If you do not hear from us within 7 days, please contact the clinic through MyChart or phone. If you have a critical or abnormal lab result, we will notify you by phone as soon as possible.  Submit refill requests through [employer] or call your pharmacy and they will forward the refill request to us. Please allow 3 business days for your refill to be completed.          Additional Information About Your Visit        Care EveryWhere ID     This is your Care EveryWhere ID. This could be used by other organizations to access your [facility] medical records  [id number]        Your Vitals Were     Pulse Temperature Height Last Period Pulse Oximetry BMI (Body Mass Index)    88 98.1  F (36.7  C) (Oral) 5' 5.25\" (1.657 m) 05/18/2018 (Exact Date) 100% 40.31 kg/m2       Blood Pressure from Last 3 Encounters:   06/06/18 116/75   09/12/17 118/74   08/29/17 116/83    Weight from Last 3 Encounters:   06/06/18 244 lb 1.6 oz (110.7 kg)   06/28/17 240 lb 14.4 oz (109.3 kg)   09/29/16 234 lb 6.4 oz (106.3 kg)              We Performed the Following     DERMATOLOGY REFERRAL     HPV High Risk Types DNA Cervical     [facility] PT, HAND, AND CHIROPRACTIC REFERRAL     Pap imaged thin layer screen with HPV - recommended age 30 - 65 years (select HPV order below)        Primary Care Provider Office Phone # Fax #    Pablo Mcdowell [phone] [address]  [place] [address]        Equal Access to Services     [facility]: Hadii edward ku leonard Solola, waaxda luqadaha, qaybta kaalmada filibertohananesharon, waxtamika armin newtonchanabby pimentel. So [facility] [phone].    ATENCIÓN: Si habla español, tiene a rogers disposición servicios gratuitos de asistencia lingüística. Jenniferame al [phone].    We comply with applicable federal civil rights laws and [place] laws. We do not discriminate on the basis of race, color, national origin, age, disability, sex, sexual orientation, or gender identity.            Thank you!     Thank you for choosing [facility]  for your care. Our goal is always to provide you with excellent care. Hearing back from our patients is one way we can continue to improve our services. Please take a few minutes to complete the written survey that you may receive in the mail after your visit with us. Thank you!             Your Updated Medication List - Protect others around you: Learn how to safely use, store and throw away your medicines at www.disposemymeds.org.          This list is accurate as of 6/6/18  2:39 PM.  Always use your most recent med list.                   Brand Name Dispense Instructions for use Diagnosis    albuterol 108 (90 Base) MCG/ACT Inhaler    PROAIR HFA/PROVENTIL HFA/VENTOLIN HFA    1 Inhaler    Inhale 2 puffs into the lungs every 6 hours as needed for shortness of breath / dyspnea.    Intermittent asthma       ciclopirox 0.77 % cream    LOPROX    90 g    Apply topically 2 times daily To feet and affected toenail.    Tinea pedis of both feet, Dermatophytosis of nail       lamoTRIgine 100 MG tablet    LaMICtal     Take 100 mg by mouth every morning        olopatadine HCl 0.2 % Soln    PATADAY    1 Bottle    Place 1 drop into both eyes daily as needed (For itchy, water eyes.)    Allergic conjunctivitis, bilateral       paragard intrauterine copper     1 each    One device, intrauterine, for up to 10 years.    Encounter  for IUD insertion       venlafaxine 150 MG 24 hr capsule    EFFEXOR-XR     Take 150 mg by mouth daily

## 2018-06-06 NOTE — LETTER
June 14, 2018    Evan Dominguez  [address] [address]    Dear Evan,  We are happy to inform you that your PAP smear result from 6/6/18 is normal.  We are now able to do a follow up test on PAP smears. The DNA test is for HPV (Human Papilloma Virus). Cervical cancer is closely linked with certain types of HPV. Your results showed no evidence of high risk HPV.  Therefore we recommend you return in 3 years for your next pap smear.  You will still need to return to the clinic every year for an annual exam and other preventive tests.  Please contact the clinic at [phone] with any questions.  Sincerely,    Pablo Mcdowell MD/kavitha

## 2018-06-06 NOTE — LETTER
My Depression Action Plan  Name: Evan Dominguez   Date of Birth 1980  Date: 6/6/2018    My doctor: Pablo Mcdowell   My clinic: [address] [address] [phone]          GREEN    ZONE   Good Control    What it looks like:     Things are going generally well. You have normal up s and down s. You may even feel depressed from time to time, but bad moods usually last less than a day.   What you need to do:  1. Continue to care for yourself (see self care plan)  2. Check your depression survival kit and update it as needed  3. Follow your physician s recommendations including any medication.  4. Do not stop taking medication unless you consult with your physician first.           YELLOW         ZONE Getting Worse    What it looks like:     Depression is starting to interfere with your life.     It may be hard to get out of bed; you may be starting to isolate yourself from others.    Symptoms of depression are starting to last most all day and this has happened for several days.     You may have suicidal thoughts but they are not constant.   What you need to do:     1. Call your care team, your response to treatment will improve if you keep your care team informed of your progress. Yellow periods are signs an adjustment may need to be made.     2. Continue your self-care, even if you have to fake it!    3. Talk to someone in your support network    4. Open up your depression survival kit           RED    ZONE Medical Alert - Get Help    What it looks like:     Depression is seriously interfering with your life.     You may experience these or other symptoms: You can t get out of bed most days, can t work or engage in other necessary activities, you have trouble taking care of basic hygiene, or basic responsibilities, thoughts of suicide or death that will not go away, self-injurious behavior.     What you need to do:  1. Call your care team and request  a same-day appointment. If they are not available (weekends or after hours) call your local crisis line, emergency room or 911.      Electronically signed by: Pablo Mcdowell, June 6, 2018    Depression Self Care Plan / Survival Kit    Self-Care for Depression  Here s the deal. Your body and mind are really not as separate as most people think.  What you do and think affects how you feel and how you feel influences what you do and think. This means if you do things that people who feel good do, it will help you feel better.  Sometimes this is all it takes.  There is also a place for medication and therapy depending on how severe your depression is, so be sure to consult with your medical provider and/ or Behavioral Health Consultant if your symptoms are worsening or not improving.     In order to better manage my stress, I will:    Exercise  Get some form of exercise, every day. This will help reduce pain and release endorphins, the  feel good  chemicals in your brain. This is almost as good as taking antidepressants!  This is not the same as joining a gym and then never going! (they count on that by the way ) It can be as simple as just going for a walk or doing some gardening, anything that will get you moving.      Hygiene   Maintain good hygiene (Get out of bed in the morning, Make your bed, Brush your teeth, Take a shower, and Get dressed like you were going to work, even if you are unemployed).  If your clothes don't fit try to get ones that do.    Diet  I will strive to eat foods that are good for me, drink plenty of water, and avoid excessive sugar, caffeine, alcohol, and other mood-altering substances.  Some foods that are helpful in depression are: complex carbohydrates, B vitamins, flaxseed, fish or fish oil, fresh fruits and vegetables.    Psychotherapy  I agree to participate in Individual Therapy (if recommended).    Medication  If prescribed medications, I agree to take them.  Missing doses can  result in serious side effects.  I understand that drinking alcohol, or other illicit drug use, may cause potential side effects.  I will not stop my medication abruptly without first discussing it with my provider.    Staying Connected With Others  I will stay in touch with my friends, family members, and my primary care provider/team.    Use your imagination  Be creative.  We all have a creative side; it doesn t matter if it s oil painting, sand castles, or mud pies! This will also kick up the endorphins.    Witness Beauty  (AKA stop and smell the roses) Take a look outside, even in mid-winter. Notice colors, textures. Watch the squirrels and birds.     Service to others  Be of service to others.  There is always someone else in need.  By helping others we can  get out of ourselves  and remember the really important things.  This also provides opportunities for practicing all the other parts of the program.    Humor  Laugh and be silly!  Adjust your TV habits for less news and crime-drama and more comedy.    Control your stress  Try breathing deep, massage therapy, biofeedback, and meditation. Find time to relax each day.     My support system    Clinic Contact:  Phone number:    Contact 1:  Phone number:    Contact 2:  Phone number:    Bahai/:  Phone number:    Therapist:  Phone number:    Local crisis center:    Phone number:    Other community support:  Phone number:

## 2018-06-06 NOTE — LETTER
My Asthma Action Plan  Name: Evan Dominguez   YOB: 1980  Date: 6/6/2018   My doctor: Pablo Mcdowell   My clinic: [facility]      My Control Medicine: Albuterol        Dose:   My Rescue Medicine: Albuterol        Dose:    My Asthma Severity: intermittent  Avoid your asthma triggers: upper respiratory infections        GREEN ZONE   Good Control    I feel good    No cough or wheeze    Can work, sleep and play without asthma symptoms       Take your asthma control medicine every day.     1. If exercise triggers your asthma, take your rescue medication    15 minutes before exercise or sports, and    During exercise if you have asthma symptoms  2. Spacer to use with inhaler: If you have a spacer, make sure to use it with your inhaler             YELLOW ZONE Getting Worse  I have ANY of these:    I do not feel good    Cough or wheeze    Chest feels tight    Wake up at night   1. Keep taking your Green Zone medications  2. Start taking your rescue medicine:    every 20 minutes for up to 1 hour. Then every 4 hours for 24-48 hours.  3. If you stay in the Yellow Zone for more than 12-24 hours, contact your doctor.  4. If you do not return to the Green Zone in 12-24 hours or you get worse, start taking your oral steroid medicine if prescribed by your provider.           RED ZONE Medical Alert - Get Help  I have ANY of these:    I feel awful    Medicine is not helping    Breathing getting harder    Trouble walking or talking    Nose opens wide to breathe       1. Take your rescue medicine NOW  2. If your provider has prescribed an oral steroid medicine, start taking it NOW  3. Call your doctor NOW  4. If you are still in the Red Zone after 20 minutes and you have not reached your doctor:    Take your rescue medicine again and    Call 911 or go to the emergency room right away    See your regular doctor within 2 weeks of an Emergency Room or Urgent Care visit for follow-up treatment.         The above medication may be given at school or day care?: N/A (Adult Patient)  Child can carry and use inhaler(s) at school with approval of school nurse?: N/A (Adult Patient)    Electronically signed by: Pablo Mcdowell, June 6, 2018    Annual Reminders:  Meet with Asthma Educator,  Flu Shot in the Fall, consider Pneumonia Vaccination for patients with asthma (aged 19 and older).    Pharmacy: [place] [address] [address]                    Asthma Triggers  How To Control Things That Make Your Asthma Worse    Triggers are things that make your asthma worse.  Look at the list below to help you find your triggers and what you can do about them.  You can help prevent asthma flare-ups by staying away from your triggers.      Trigger                                                          What you can do   Cigarette Smoke  Tobacco smoke can make asthma worse. Do not allow smoking in your home, car or around you.  Be sure no one smokes at a child s day care or school.  If you smoke, ask your health care provider for ways to help you quit.  Ask family members to quit too.  Ask your health care provider for a referral to Quit Plan to help you quit smoking, or call 9-413-514-PLAN.     Colds, Flu, Bronchitis  These are common triggers of asthma. Wash your hands often.  Don t touch your eyes, nose or mouth.  Get a flu shot every year.     Dust Mites  These are tiny bugs that live in cloth or carpet. They are too small to see. Wash sheets and blankets in hot water every week.   Encase pillows and mattress in dust mite proof covers.  Avoid having carpet if you can. If you have carpet, vacuum weekly.   Use a dust mask and HEPA vacuum.   Pollen and Outdoor Mold  Some people are allergic to trees, grass, or weed pollen, or molds. Try to keep your windows closed.  Limit time out doors when pollen count is high.   Ask you health care provider about taking medicine during allergy season.      Animal Dander  Some people are allergic to skin flakes, urine or saliva from pets with fur or feathers. Keep pets with fur or feathers out of your home.    If you can t keep the pet outdoors, then keep the pet out of your bedroom.  Keep the bedroom door closed.  Keep pets off cloth furniture and away from stuffed toys.     Mice, Rats, and Cockroaches  Some people are allergic to the waste from these pests.   Cover food and garbage.  Clean up spills and food crumbs.  Store grease in the refrigerator.   Keep food out of the bedroom.   Indoor Mold  This can be a trigger if your home has high moisture. Fix leaking faucets, pipes, or other sources of water.   Clean moldy surfaces.  Dehumidify basement if it is damp and smelly.   Smoke, Strong Odors, and Sprays  These can reduce air quality. Stay away from strong odors and sprays, such as perfume, powder, hair spray, paints, smoke incense, paint, cleaning products, candles and new carpet.   Exercise or Sports  Some people with asthma have this trigger. Be active!  Ask your doctor about taking medicine before sports or exercise to prevent symptoms.    Warm up for 5-10 minutes before and after sports or exercise.     Other Triggers of Asthma  Cold air:  Cover your nose and mouth with a scarf.  Sometimes laughing or crying can be a trigger.  Some medicines and food can trigger asthma.

## 2018-06-07 ENCOUNTER — TELEPHONE (OUTPATIENT)
Dept: PEDIATRICS | Facility: CLINIC | Age: 38
End: 2018-06-07

## 2018-06-07 DIAGNOSIS — Z13.6 CARDIOVASCULAR SCREENING; LDL GOAL LESS THAN 160: ICD-10-CM

## 2018-06-07 DIAGNOSIS — Z00.01 ENCOUNTER FOR GENERAL ADULT MEDICAL EXAMINATION WITH ABNORMAL FINDINGS: ICD-10-CM

## 2018-06-07 DIAGNOSIS — G56.03 BILATERAL CARPAL TUNNEL SYNDROME: ICD-10-CM

## 2018-06-07 DIAGNOSIS — Z13.1 SCREENING FOR DIABETES MELLITUS (DM): ICD-10-CM

## 2018-06-07 DIAGNOSIS — Z13.0 SCREENING FOR DEFICIENCY ANEMIA: ICD-10-CM

## 2018-06-07 DIAGNOSIS — R20.0 BILATERAL HAND NUMBNESS: ICD-10-CM

## 2018-06-07 DIAGNOSIS — Z13.29 THYROID DISORDER SCREEN: ICD-10-CM

## 2018-06-07 DIAGNOSIS — D50.9 IRON DEFICIENCY ANEMIA, UNSPECIFIED IRON DEFICIENCY ANEMIA TYPE: Primary | ICD-10-CM

## 2018-06-07 DIAGNOSIS — E66.01 MORBID OBESITY WITH BMI OF 40.0-44.9, ADULT (H): ICD-10-CM

## 2018-06-07 DIAGNOSIS — Z90.5 S/P NEPHRECTOMY: ICD-10-CM

## 2018-06-07 LAB
ALBUMIN SERPL-MCNC: 3.7 G/DL (ref 3.4–5)
ALP SERPL-CCNC: 83 U/L (ref 40–150)
ALT SERPL W P-5'-P-CCNC: 21 U/L (ref 0–50)
ANION GAP SERPL CALCULATED.3IONS-SCNC: 6 MMOL/L (ref 3–14)
AST SERPL W P-5'-P-CCNC: 12 U/L (ref 0–45)
BASOPHILS # BLD AUTO: 0.1 10E9/L (ref 0–0.2)
BASOPHILS NFR BLD AUTO: 0.8 %
BILIRUB SERPL-MCNC: 0.3 MG/DL (ref 0.2–1.3)
BUN SERPL-MCNC: 13 MG/DL (ref 7–30)
CALCIUM SERPL-MCNC: 8.7 MG/DL (ref 8.5–10.1)
CHLORIDE SERPL-SCNC: 107 MMOL/L (ref 94–109)
CHOLEST SERPL-MCNC: 152 MG/DL
CO2 SERPL-SCNC: 26 MMOL/L (ref 20–32)
CREAT SERPL-MCNC: 0.81 MG/DL (ref 0.52–1.04)
DIFFERENTIAL METHOD BLD: ABNORMAL
EOSINOPHIL # BLD AUTO: 1 10E9/L (ref 0–0.7)
EOSINOPHIL NFR BLD AUTO: 11.5 %
ERYTHROCYTE [DISTWIDTH] IN BLOOD BY AUTOMATED COUNT: 16 % (ref 10–15)
GFR SERPL CREATININE-BSD FRML MDRD: 79 ML/MIN/1.7M2
GLUCOSE SERPL-MCNC: 91 MG/DL (ref 70–99)
HCT VFR BLD AUTO: 30 % (ref 35–47)
HDLC SERPL-MCNC: 47 MG/DL
HGB BLD-MCNC: 8.5 G/DL (ref 11.7–15.7)
IMM GRANULOCYTES # BLD: 0 10E9/L (ref 0–0.4)
IMM GRANULOCYTES NFR BLD: 0.3 %
LDLC SERPL CALC-MCNC: 84 MG/DL
LYMPHOCYTES # BLD AUTO: 2.8 10E9/L (ref 0.8–5.3)
LYMPHOCYTES NFR BLD AUTO: 31 %
MCH RBC QN AUTO: 19.2 PG (ref 26.5–33)
MCHC RBC AUTO-ENTMCNC: 28.3 G/DL (ref 31.5–36.5)
MCV RBC AUTO: 68 FL (ref 78–100)
MONOCYTES # BLD AUTO: 0.8 10E9/L (ref 0–1.3)
MONOCYTES NFR BLD AUTO: 8.7 %
NEUTROPHILS # BLD AUTO: 4.3 10E9/L (ref 1.6–8.3)
NEUTROPHILS NFR BLD AUTO: 47.7 %
NONHDLC SERPL-MCNC: 105 MG/DL
PLATELET # BLD AUTO: 308 10E9/L (ref 150–450)
POTASSIUM SERPL-SCNC: 4.1 MMOL/L (ref 3.4–5.3)
PROT SERPL-MCNC: 7.6 G/DL (ref 6.8–8.8)
RBC # BLD AUTO: 4.43 10E12/L (ref 3.8–5.2)
SODIUM SERPL-SCNC: 139 MMOL/L (ref 133–144)
TRIGL SERPL-MCNC: 105 MG/DL
TSH SERPL DL<=0.005 MIU/L-ACNC: 2.27 MU/L (ref 0.4–4)
WBC # BLD AUTO: 9 10E9/L (ref 4–11)

## 2018-06-07 PROCEDURE — 84443 ASSAY THYROID STIM HORMONE: CPT | Performed by: FAMILY MEDICINE

## 2018-06-07 PROCEDURE — 80053 COMPREHEN METABOLIC PANEL: CPT | Performed by: FAMILY MEDICINE

## 2018-06-07 PROCEDURE — 80061 LIPID PANEL: CPT | Performed by: FAMILY MEDICINE

## 2018-06-07 PROCEDURE — 36415 COLL VENOUS BLD VENIPUNCTURE: CPT | Performed by: FAMILY MEDICINE

## 2018-06-07 PROCEDURE — 85025 COMPLETE CBC W/AUTO DIFF WBC: CPT | Performed by: FAMILY MEDICINE

## 2018-06-07 RX ORDER — FERROUS GLUCONATE 324(38)MG
324 TABLET ORAL
Qty: 100 TABLET | Refills: 1 | Status: SHIPPED | OUTPATIENT
Start: 2018-06-07 | End: 2019-07-22

## 2018-06-07 ASSESSMENT — PATIENT HEALTH QUESTIONNAIRE - PHQ9: SUM OF ALL RESPONSES TO PHQ QUESTIONS 1-9: 4

## 2018-06-07 ASSESSMENT — ANXIETY QUESTIONNAIRES: GAD7 TOTAL SCORE: 4

## 2018-06-07 ASSESSMENT — ASTHMA QUESTIONNAIRES: ACT_TOTALSCORE: 24

## 2018-06-08 NOTE — TELEPHONE ENCOUNTER
Called patient on both home and cell.    Left messages with phone number to call back.     If patient calls back, please ask if ok to leave detailed message on her home or cell.      Breanna Greenberg RN, [facility]

## 2018-06-08 NOTE — TELEPHONE ENCOUNTER
Called patient and gave message per Dr. Mcdowell.   Discussed Iron side effects.  She has been on Iron before.  Scheduled lab appt on 7/10/18.   Asked to call back with any worsening or new concerns.   Patient verbalized understanding and agreement to plan.     Breanna Greenberg RN, [facility]

## 2018-06-08 NOTE — TELEPHONE ENCOUNTER
Please inform patient of labs- normal thyroid, fasting glucose, liver and kidney tests, cholesterol.  CBC- very low., likely causing or aggravating her hand numbness.   I sent a script for iron tablet to take twice daily., will recheck CBC in 4-5 weeks.    Hemoglobin   Date Value Ref Range Status   06/07/2018 8.5 (L) 11.7 - 15.7 g/dL Final   09/01/2015 11.7 11.7 - 15.7 g/dL Final   ]

## 2018-06-11 ENCOUNTER — THERAPY VISIT (OUTPATIENT)
Dept: OCCUPATIONAL THERAPY | Facility: CLINIC | Age: 38
End: 2018-06-11
Payer: COMMERCIAL

## 2018-06-11 DIAGNOSIS — G56.03 BILATERAL CARPAL TUNNEL SYNDROME: ICD-10-CM

## 2018-06-11 DIAGNOSIS — M25.532 PAIN IN BOTH WRISTS: Primary | ICD-10-CM

## 2018-06-11 DIAGNOSIS — R20.0 BILATERAL HAND NUMBNESS: ICD-10-CM

## 2018-06-11 DIAGNOSIS — M25.531 PAIN IN BOTH WRISTS: Primary | ICD-10-CM

## 2018-06-11 DIAGNOSIS — M25.522 ELBOW PAIN, LEFT: ICD-10-CM

## 2018-06-11 LAB
COPATH REPORT: NORMAL
PAP: NORMAL

## 2018-06-11 PROCEDURE — 97165 OT EVAL LOW COMPLEX 30 MIN: CPT | Mod: GO | Performed by: OCCUPATIONAL THERAPIST

## 2018-06-11 PROCEDURE — 97110 THERAPEUTIC EXERCISES: CPT | Mod: GO | Performed by: OCCUPATIONAL THERAPIST

## 2018-06-11 PROCEDURE — 97112 NEUROMUSCULAR REEDUCATION: CPT | Mod: GO | Performed by: OCCUPATIONAL THERAPIST

## 2018-06-11 PROCEDURE — 97140 MANUAL THERAPY 1/> REGIONS: CPT | Mod: GO | Performed by: OCCUPATIONAL THERAPIST

## 2018-06-11 NOTE — PROGRESS NOTES
Hand Therapy Initial Evaluation    Current Date:  6/11/2018       Diagnosis: L elbow strain, B CTS  Onset: 1 month ago  MD order:  6/6/18     Subjective  Evan Dominguez is a 38 year old Right hand dominant female.    Patient reports symptoms of pain, stiffness/loss of motion, weakness/loss of strength, numbness and tingling  of the bilateral hand and left elbow which occurred due to unknown etiology for the hands; increased yard work for elbow pain. Since onset symptoms are Gradually getting better.  Special tests:  none.  Previous treatment: OTC splints for the wrists/hands.    General health as reported by patient is good.  Pertinent medical history includes:Anemia, Asthma, Depression, Migraines/Headaches, Numbness/Tingling, Overweight.  Medical allergies:medication, contrast dye.  Surgical history: orthopedic R ganglion cyst (wrist), other: see medical chart.  Medication history: Anti-depressants.    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  Currently working in normal job without restrictions  Job Tasks: Computer Work, Prolonged Standing, Repetitive Tasks  Prior functional level:  independent-shared household chores  Barriers include:none  Mobility: No difficulty  Transportation: drives  Leisure activities/hobbies: sewing, yardwork, painting, 2-kids, biking    Identified Performance Deficits:  functional mobility, hygiene and grooming, child rearing, driving and community mobility, health management and maintenance, home establishment and management, meal preparation and cleanup, sleep, work and leisure activities    Red flags:  none    Functional Outcome Measure:  Upper Extremity Functional Index Score:  SCORE:   Column Totals: 70/80  (A lower score indicates greater disability.)    Objective  Pain Level Report  VAS(0-10) 6/11/2018   At Rest: 0   With Use: 2     Report of Pain:  Location:  elbow and hands  Pain Quality:  Aching  Frequency: intermittent    Pain is worst:   Dependant on use and activity  Exacerbated by:  Lifting, pushing  Relieved by:  rest    Numbness/Tingling Level Report  VAS(0-10) 6/11/2018   At Rest: 0-1   At worst: 4   Location: B thumb, index, long and ring fingers, more noticeable in finger tips:  Worse in the morning  Edema:  NONE  Sensation: Decreased Median Nerve distribution and slight decreased to ulnar as well    ROM:  Wrist  6/11/2018    Left Right   Extension 84 80   Flexion 80 82   RD 14 11   UD 42 36   B elbow flex and ext WFL    Special Tests:  Date  6/11/2018   Side Left Right   Phalens ~13 seconds ~13 seconds   Tinels at CT neg positive   Tinels at Pronator neg neg   Median Nerve ULTT Distal tension with passive testing; active ~30% of range Distal tension with passive testing; active ~30% of range   Paresthesias Thumb, IF, LF and RF Thumb, IF, LF and RF   Resisted LF No pain nt   Radial Nerve ULTT neg nt     Palpation:  (tenderness 0-10/10)   Left 6/11/2018   LEP 0   Ext Wad 2   Ext origin 2   PIN 2     MMT:  (strength 0-5/5, pain 0-10/10)  Left 6/11/2018   Wrist ext 4+/5, 1/10   Wrist ext with elbow ext 4/5, 3/10   Elbow flex-neutral 5/5, 0   Elbow flex-sup 4+/5, 2/10   Elbow flex-pro 5/5, 0/10     Strength: (Measured in pounds, pain scale 0-10/10)    Date 6/11/2018      Trials Left Right Left Right Left Right Left Right   1 46.6 62.9         2           3           Avg           Pain 0 0           3 Point Pinch  Date 6/11/2018      Trials Left Right Left Right Left Right Left Right   1 12 11         2           3           Avg           Pain             Lateral Pinch  Date 6/11/2018      Trials Left Right Left Right Left Right Left Right   1 12* 13*         2           3           Avg           Pain           *mild collapse of MP noted    Assessment  Patient presents with symptoms consistent with diagnosis of B CTS and L elbow strain,  with conservative intervention.     Patient's limitations or Problem List includes:  Pain, Decreased  ROM/motion, Weakness, Sensory disturbance, Decreased , Decreased pinch and Tightness in musculature of the bilateral wrists and left elbow which interferes with the patient's ability to perform Self Care Tasks, Work Tasks, Sleep Patterns, Recreational Activities, Household Chores and Driving  as compared to previous level of function.    Rehab Potential:  Excellent - Return to full activity, no limitations    Patient will benefit from skilled Occupational Therapy to increase ROM, flexibility, overall strength and sensation and decrease pain, muscle and neural tension to return to previous activity level and resume normal daily tasks and to reach their rehab potential.    Chart Review: Brief history including review of medical and/or therapy records relating to the presenting problem  Assessment of Occupational Performance:  5 or more Performance Deficits  Identified Performance Deficits: functional mobility, hygiene and grooming, child rearing, driving and community mobility, health management and maintenance, home establishment and management, meal preparation and cleanup, sleep, work and leisure activities      Clinical Decision Making (Complexity): Low complexity    Barriers to Learning:  No barrier    Communication Issues:  Patient appears to be able to clearly communicate and understand verbal and written communication and follow directions correctly.    Treatment Explanation:  The following has been discussed with the patient:  RX ordered/plan of care  Anticipated outcomes  Possible risks and side effects    Plan  Frequency:  1 X week, once daily  Duration:  for 6 weeks    Treatment Plan:    Modalities:  US  Therapeutic Exercise:  AROM, AAROM, PROM, Tendon Gliding, Isotonics, Isometrics and Stabilization  Neuromuscular re-education:  Nerve Gliding, Sensory re-education, Kinesthetic Training, Proprioceptive Training, Posture, Kinesiotaping and Stabilization  Manual Techniques:  Joint mobilization and  Myofascial release  Orthotic Fabrication:  Static orthosis  Self Care:  Self Care Tasks, Ergonomic Considerations, Work Tasks and Education    Discharge Plan:  Achieve all LTG.  Independent in home treatment program.  Reach maximal therapeutic benefit.    Home Exercise Program:  Wrist flex/ext stretches  Proximal and distal median nerve glides  Cont with OTC wrist braces at night, day PRN  Reviewed work station setup/typing, minimal education on posture    Next Visit:  MFR  Patient to bring in OTC wrist braces, may need to fabricate custom  Passive nerve glides  Add scap retraction to HEP  Progress HEP as indicated; review position of hands for biking

## 2018-06-11 NOTE — MR AVS SNAPSHOT
After Visit Summary   6/11/2018    Evan Dominguez    MRN: [id number]           Patient Information     Date Of Birth          1980        Visit Information        Provider Department      6/11/2018 7:30 AM Hari Hanks, OT [facility]        Today's Diagnoses     Pain in both wrists    -  1    Elbow pain, left        Bilateral carpal tunnel syndrome        Bilateral hand numbness           Follow-ups after your visit        Your next 10 appointments already scheduled     Jun 20, 2018  1:00 PM CDT   TAN Hand with Tiffanie Borwn, OT   [facility] ([facility])    [address]  [address]  [place] [address]   [phone]            Jun 25, 2018  7:30 AM CDT   TAN Hand with Tiffanie Brown, OT   [facility] ([facility])    [address]  [address]  [place] [address] [phone]            Jul 10, 2018  7:50 AM CDT   LAB with LAB FIRST FLOOR [facility] ([facility])    [address]  [facility] [address] [phone]           Please do not eat 10-12 hours before your appointment if you are coming in fasting for labs on lipids, cholesterol, or glucose (sugar). This does not apply to pregnant women. Water, hot tea and black coffee (with nothing added) are okay. Do not drink other fluids, diet soda or chew gum.            Aug 06, 2018  6:00 PM CDT   (Arrive by 5:45 PM)   New Patient Visit with Cricket Dougherty MD   [facility] ([facility])    [address]  3rd Floor  [facility] [address] [phone]              Who to contact     If you have questions or need follow up information about today's clinic visit or your schedule please contact [facility] directly at [phone].  Normal or non-critical lab and imaging results will be communicated to you by MyChart, letter or phone within 4  business days after the clinic has received the results. If you do not hear from us within 7 days, please contact the clinic through [employer] or phone. If you have a critical or abnormal lab result, we will notify you by phone as soon as possible.  Submit refill requests through [employer] or call your pharmacy and they will forward the refill request to us. Please allow 3 business days for your refill to be completed.          Additional Information About Your Visit        Care EveryWhere ID     This is your Care EveryWhere ID. This could be used by other organizations to access your [facility] medical records  [id number]        Your Vitals Were     Last Period                   05/18/2018 (Exact Date)            Blood Pressure from Last 3 Encounters:   06/06/18 116/75   09/12/17 118/74   08/29/17 116/83    Weight from Last 3 Encounters:   06/06/18 110.7 kg (244 lb 1.6 oz)   06/28/17 109.3 kg (240 lb 14.4 oz)   09/29/16 106.3 kg (234 lb 6.4 oz)              We Performed the Following     HC OT EVAL, LOW COMPLEXITY     MANUAL THER TECH,1+REGIONS,EA 15 MIN     NEUROMUSCULAR RE-EDUCATION     THERAPEUTIC EXERCISES        Primary Care Provider Office Phone # Fax #    Pablo Mcdowell [phone] [address]  [facility] [address]        Equal Access to Services     [place] : Hadii aad ku hadasho Soomaali, waaxda luqadaha, qaybta kaalmada adeegyada, gregory funez hayabena pham . So [facility] [phone].    ATENCIÓN: Si habla español, tiene a rogers disposición servicios gratuitos de asistencia lingüística. Llame al [phone].    We comply with applicable federal civil rights laws and [place] laws. We do not discriminate on the basis of race, color, national origin, age, disability, sex, sexual orientation, or gender identity.            Thank you!     Thank you for choosing [facility]  for your care. Our goal is always to provide you with excellent care. Hearing back  from our patients is one way we can continue to improve our services. Please take a few minutes to complete the written survey that you may receive in the mail after your visit with us. Thank you!             Your Updated Medication List - Protect others around you: Learn how to safely use, store and throw away your medicines at www.disposemymeds.org.          This list is accurate as of 6/11/18  8:56 AM.  Always use your most recent med list.                   Brand Name Dispense Instructions for use Diagnosis    albuterol 108 (90 Base) MCG/ACT Inhaler    PROAIR HFA/PROVENTIL HFA/VENTOLIN HFA    1 Inhaler    Inhale 2 puffs into the lungs every 6 hours as needed for shortness of breath / dyspnea.    Intermittent asthma       ciclopirox 0.77 % cream    LOPROX    90 g    Apply topically 2 times daily To feet and affected toenail.    Tinea pedis of both feet, Dermatophytosis of nail       ferrous gluconate 324 (38 Fe) MG tablet    FERGON    100 tablet    Take 1 tablet (324 mg) by mouth daily (with breakfast)    Iron deficiency anemia, unspecified iron deficiency anemia type       lamoTRIgine 100 MG tablet    LaMICtal     Take 100 mg by mouth every morning        olopatadine HCl 0.2 % Soln    PATADAY    1 Bottle    Place 1 drop into both eyes daily as needed (For itchy, water eyes.)    Allergic conjunctivitis, bilateral       paragard intrauterine copper     1 each    One device, intrauterine, for up to 10 years.    Encounter for IUD insertion       venlafaxine 150 MG 24 hr capsule    EFFEXOR-XR     Take 150 mg by mouth daily

## 2018-06-12 LAB
FINAL DIAGNOSIS: NORMAL
HPV HR 12 DNA CVX QL NAA+PROBE: NEGATIVE
HPV16 DNA SPEC QL NAA+PROBE: NEGATIVE
HPV18 DNA SPEC QL NAA+PROBE: NEGATIVE
SPECIMEN DESCRIPTION: NORMAL
SPECIMEN SOURCE CVX/VAG CYTO: NORMAL

## 2018-06-20 ENCOUNTER — THERAPY VISIT (OUTPATIENT)
Dept: OCCUPATIONAL THERAPY | Facility: CLINIC | Age: 38
End: 2018-06-20
Payer: COMMERCIAL

## 2018-06-20 DIAGNOSIS — M25.531 PAIN IN BOTH WRISTS: ICD-10-CM

## 2018-06-20 DIAGNOSIS — R20.0 BILATERAL HAND NUMBNESS: ICD-10-CM

## 2018-06-20 DIAGNOSIS — M25.532 PAIN IN BOTH WRISTS: ICD-10-CM

## 2018-06-20 DIAGNOSIS — M25.522 ELBOW PAIN, LEFT: ICD-10-CM

## 2018-06-20 DIAGNOSIS — G56.03 BILATERAL CARPAL TUNNEL SYNDROME: ICD-10-CM

## 2018-06-20 PROCEDURE — 97110 THERAPEUTIC EXERCISES: CPT | Mod: GO | Performed by: OCCUPATIONAL THERAPIST

## 2018-06-20 PROCEDURE — 97112 NEUROMUSCULAR REEDUCATION: CPT | Mod: GO | Performed by: OCCUPATIONAL THERAPIST

## 2018-06-20 PROCEDURE — 97140 MANUAL THERAPY 1/> REGIONS: CPT | Mod: GO | Performed by: OCCUPATIONAL THERAPIST

## 2018-06-20 NOTE — MR AVS SNAPSHOT
After Visit Summary   6/20/2018    Evan Dominguez    MRN: [id number]           Patient Information     Date Of Birth          1980        Visit Information        Provider Department      6/20/2018 1:00 PM Tiffanie Brown OT [facility]        Today's Diagnoses     Pain in both wrists        Elbow pain, left        Bilateral carpal tunnel syndrome        Bilateral hand numbness           Follow-ups after your visit        Your next 10 appointments already scheduled     Jun 25, 2018  7:30 AM CDT   TAN Hand with Tiffanie Brown OT   [facility]    [address]  [address]  [place] [id number] [phone]            Jul 10, 2018  7:50 AM CDT   LAB with LAB FIRST FLOOR [facility] ([facility])    [address]  [facility] [address] [phone]           Please do not eat 10-12 hours before your appointment if you are coming in fasting for labs on lipids, cholesterol, or glucose (sugar). This does not apply to pregnant women. Water, hot tea and black coffee (with nothing added) are okay. Do not drink other fluids, diet soda or chew gum.            Aug 06, 2018  6:00 PM CDT   (Arrive by 5:45 PM)   New Patient Visit with Cricket Dougherty MD   [facility] ([facility])    [address]  3rd Floor  [facility] [address] [phone]              Who to contact     If you have questions or need follow up information about today's clinic visit or your schedule please contact [facility] directly at [phone].  Normal or non-critical lab and imaging results will be communicated to you by MyChart, letter or phone within 4 business days after the clinic has received the results. If you do not hear from us within 7 days, please contact the clinic through MyChart or phone. If you have a critical or abnormal lab result, we  will notify you by phone as soon as possible.  Submit refill requests through [employer] or call your pharmacy and they will forward the refill request to us. Please allow 3 business days for your refill to be completed.          Additional Information About Your Visit        Care EveryWhere ID     This is your Care EveryWhere ID. This could be used by other organizations to access your [facility] medical records  [id number]         Blood Pressure from Last 3 Encounters:   06/06/18 116/75   09/12/17 118/74   08/29/17 116/83    Weight from Last 3 Encounters:   06/06/18 110.7 kg (244 lb 1.6 oz)   06/28/17 109.3 kg (240 lb 14.4 oz)   09/29/16 106.3 kg (234 lb 6.4 oz)              We Performed the Following     MANUAL THER TECH,1+REGIONS,EA 15 MIN     NEUROMUSCULAR RE-EDUCATION     THERAPEUTIC EXERCISES        Primary Care Provider Office Phone # Fax #    Pablo Mcdowell [phone] [address]  [facility] [address]        Equal Access to Services     [facility]: Hadii aad ku hadasho Soomaali, waaxda luqadaha, qaybta kaalmada adeegyada, waxay idiin hayabena pham . So [facility] [phone].    ATENCIÓN: Si habla español, tiene a rogers disposición servicios gratuitos de asistencia lingüística. Llame al [phone].    We comply with applicable federal civil rights laws and [place] laws. We do not discriminate on the basis of race, color, national origin, age, disability, sex, sexual orientation, or gender identity.            Thank you!     Thank you for choosing [facility]  for your care. Our goal is always to provide you with excellent care. Hearing back from our patients is one way we can continue to improve our services. Please take a few minutes to complete the written survey that you may receive in the mail after your visit with us. Thank you!             Your Updated Medication List - Protect others around you: Learn how to safely use, store and throw away your  medicines at www.disposemymeds.org.          This list is accurate as of 6/20/18  9:49 PM.  Always use your most recent med list.                   Brand Name Dispense Instructions for use Diagnosis    albuterol 108 (90 Base) MCG/ACT Inhaler    PROAIR HFA/PROVENTIL HFA/VENTOLIN HFA    1 Inhaler    Inhale 2 puffs into the lungs every 6 hours as needed for shortness of breath / dyspnea.    Intermittent asthma       ciclopirox 0.77 % cream    LOPROX    90 g    Apply topically 2 times daily To feet and affected toenail.    Tinea pedis of both feet, Dermatophytosis of nail       ferrous gluconate 324 (38 Fe) MG tablet    FERGON    100 tablet    Take 1 tablet (324 mg) by mouth daily (with breakfast)    Iron deficiency anemia, unspecified iron deficiency anemia type       lamoTRIgine 100 MG tablet    LaMICtal     Take 100 mg by mouth every morning        olopatadine HCl 0.2 % Soln    PATADAY    1 Bottle    Place 1 drop into both eyes daily as needed (For itchy, water eyes.)    Allergic conjunctivitis, bilateral       paragard intrauterine copper     1 each    One device, intrauterine, for up to 10 years.    Encounter for IUD insertion       venlafaxine 150 MG 24 hr capsule    EFFEXOR-XR     Take 150 mg by mouth daily

## 2018-06-20 NOTE — PROGRESS NOTES
SOAP Note - Hand Therapy - Objective Information    Current Date:  6/20/2018  Diagnosis: L elbow strain, B CTS  Onset: 1 month ago  MD order:  6/6/18     Evan Dominguez is a 38 year old Right hand dominant female.    Patient reports symptoms of pain, stiffness/loss of motion, weakness/loss of strength, numbness and tingling  of the bilateral hand and left elbow which occurred due to unknown etiology for the hands; increased yard work for elbow pain.     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 The left elbow has gotten better but it aggravated when holding 3 year old son.  Functional changes noted by patient: nochanges      O:  Pain Level Report  VAS(0-10) 6/11/2018 6/20/18   At Rest: 0 0/10   With Use: 2 2/10     Report of Pain:  Location:  elbow and hands  Pain Quality:  Aching  Frequency: intermittent    Pain is worst:  Dependant on use and activity  Exacerbated by:  Lifting, pushing  Relieved by:  rest    Numbness/Tingling Level Report  VAS(0-10) 6/11/2018 6/20/18   At Rest: 0-1 0/10   At worst: 4 7/10   Night  5/10   Location: B thumb, index, long and ring fingers, more noticeable in finger tips:  Worse in the morning  Edema:  NONE  Sensation: Decreased Median Nerve distribution and slight decreased to ulnar as well    ROM:  Wrist  6/11/2018    Left Right   Extension 84 80   Flexion 80 82   RD 14 11   UD 42 36   B elbow flex and ext WFL    Special Tests:  Date  6/11/2018   Side Left Right   Phalens ~13 seconds ~13 seconds   Tinels at CT neg positive   Tinels at Pronator neg neg   Median Nerve ULTT Distal tension with passive testing; active ~30% of range Distal tension with passive testing; active ~30% of range   Paresthesias Thumb, IF, LF and RF Thumb, IF, LF and RF   Resisted LF No pain nt   Radial Nerve ULTT neg nt     Palpation:  (tenderness  0-10/10)   Left 6/11/2018   LEP 0   Ext Wad 2   Ext origin 2   PIN 2     MMT:  (strength 0-5/5, pain 0-10/10)  Left 6/11/2018   Wrist ext 4+/5, 1/10   Wrist ext with elbow ext 4/5, 3/10   Elbow flex-neutral 5/5, 0   Elbow flex-sup 4+/5, 2/10   Elbow flex-pro 5/5, 0/10     Strength: (Measured in pounds, pain scale 0-10/10)    Date 6/11/2018      Trials Left Right Left Right Left Right Left Right   1 46.6 62.9         2           3           Avg           Pain 0 0           3 Point Pinch  Date 6/11/2018      Trials Left Right Left Right Left Right Left Right   1 12 11         2           3           Avg           Pain             Lateral Pinch  Date 6/11/2018      Trials Left Right Left Right Left Right Left Right   1 12* 13*         2           3           Avg           Pain           *mild collapse of MP noted    Please refer to the daily flowsheet for treatment provided today.     Home Exercise Program:  Wrist flex/ext stretches  Proximal and distal median nerve glides  Cont with OTC wrist braces at night, day PRN  Reviewed work station setup/typing, minimal education on posture  scap retraction   Chin tucks    Next Visit:  MFR  Patient to bring in OTC wrist braces, may need to fabricate custom  Passive nerve glides  Progress HEP as indicated; review position of hands for biking

## 2018-06-25 ENCOUNTER — THERAPY VISIT (OUTPATIENT)
Dept: OCCUPATIONAL THERAPY | Facility: CLINIC | Age: 38
End: 2018-06-25
Payer: COMMERCIAL

## 2018-06-25 DIAGNOSIS — G56.03 BILATERAL CARPAL TUNNEL SYNDROME: ICD-10-CM

## 2018-06-25 DIAGNOSIS — M25.522 ELBOW PAIN, LEFT: ICD-10-CM

## 2018-06-25 DIAGNOSIS — R20.0 BILATERAL HAND NUMBNESS: ICD-10-CM

## 2018-06-25 DIAGNOSIS — M25.532 PAIN IN BOTH WRISTS: ICD-10-CM

## 2018-06-25 DIAGNOSIS — M25.531 PAIN IN BOTH WRISTS: ICD-10-CM

## 2018-06-25 PROCEDURE — 97110 THERAPEUTIC EXERCISES: CPT | Mod: GO | Performed by: OCCUPATIONAL THERAPIST

## 2018-06-25 PROCEDURE — 97112 NEUROMUSCULAR REEDUCATION: CPT | Mod: GO | Performed by: OCCUPATIONAL THERAPIST

## 2018-06-25 PROCEDURE — 97140 MANUAL THERAPY 1/> REGIONS: CPT | Mod: GO | Performed by: OCCUPATIONAL THERAPIST

## 2018-06-25 NOTE — MR AVS SNAPSHOT
After Visit Summary   6/25/2018    Evan Dominguez    MRN: [id number]           Patient Information     Date Of Birth          1980        Visit Information        Provider Department      6/25/2018 7:30 AM Tiffanie Brown OT [facility]        Today's Diagnoses     Pain in both wrists        Elbow pain, left        Bilateral carpal tunnel syndrome        Bilateral hand numbness           Follow-ups after your visit        Your next 10 appointments already scheduled     Jul 10, 2018  7:50 AM CDT   LAB with LAB FIRST FLOOR [facility] ([facility])    [address]  [facility] [address]   [phone]           Please do not eat 10-12 hours before your appointment if you are coming in fasting for labs on lipids, cholesterol, or glucose (sugar). This does not apply to pregnant women. Water, hot tea and black coffee (with nothing added) are okay. Do not drink other fluids, diet soda or chew gum.            Jul 11, 2018  7:00 AM CDT   TAN Hand with Tiffanie Brown OT   [facility] ([facility])    [address]  [address]  [place] [address] [phone]            Aug 06, 2018  6:00 PM CDT   (Arrive by 5:45 PM)   New Patient Visit with Cricket Dougherty MD   [facility] ([facility])    [address]  3rd Floor  [facility] [id number] [phone]              Who to contact     If you have questions or need follow up information about today's clinic visit or your schedule please contact [facility] directly at [phone].  Normal or non-critical lab and imaging results will be communicated to you by MyChart, letter or phone within 4 business days after the clinic has received the results. If you do not hear from us within 7 days, please contact the clinic through MyChart or phone. If you have a critical or abnormal lab result, we  will notify you by phone as soon as possible.  Submit refill requests through [employer] or call your pharmacy and they will forward the refill request to us. Please allow 3 business days for your refill to be completed.          Additional Information About Your Visit        Care EveryWhere ID     This is your Care EveryWhere ID. This could be used by other organizations to access your [place] medical records  [id number]         Blood Pressure from Last 3 Encounters:   06/06/18 116/75   09/12/17 118/74   08/29/17 116/83    Weight from Last 3 Encounters:   06/06/18 110.7 kg (244 lb 1.6 oz)   06/28/17 109.3 kg (240 lb 14.4 oz)   09/29/16 106.3 kg (234 lb 6.4 oz)              We Performed the Following     MANUAL THER TECH,1+REGIONS,EA 15 MIN     NEUROMUSCULAR RE-EDUCATION     THERAPEUTIC EXERCISES        Primary Care Provider Office Phone # Fax #    Pablo Mcdowell [phone] [address]  [facility] [address]        Equal Access to Services     [facility]: Hadii aad ku hadasho Soomaali, waaxda luqadaha, qaybta kaalmada adeegyada, waxay idiin hayabena pham . So [facility] [phone].    ATENCIÓN: Si habla español, tiene a rogers disposición servicios gratuitos de asistencia lingüística. Llame al [phone].    We comply with applicable federal civil rights laws and [place] laws. We do not discriminate on the basis of race, color, national origin, age, disability, sex, sexual orientation, or gender identity.            Thank you!     Thank you for choosing [facility]  for your care. Our goal is always to provide you with excellent care. Hearing back from our patients is one way we can continue to improve our services. Please take a few minutes to complete the written survey that you may receive in the mail after your visit with us. Thank you!             Your Updated Medication List - Protect others around you: Learn how to safely use, store and throw away your  medicines at www.disposemymeds.org.          This list is accurate as of 6/25/18  1:10 PM.  Always use your most recent med list.                   Brand Name Dispense Instructions for use Diagnosis    albuterol 108 (90 Base) MCG/ACT Inhaler    PROAIR HFA/PROVENTIL HFA/VENTOLIN HFA    1 Inhaler    Inhale 2 puffs into the lungs every 6 hours as needed for shortness of breath / dyspnea.    Intermittent asthma       ciclopirox 0.77 % cream    LOPROX    90 g    Apply topically 2 times daily To feet and affected toenail.    Tinea pedis of both feet, Dermatophytosis of nail       ferrous gluconate 324 (38 Fe) MG tablet    FERGON    100 tablet    Take 1 tablet (324 mg) by mouth daily (with breakfast)    Iron deficiency anemia, unspecified iron deficiency anemia type       lamoTRIgine 100 MG tablet    LaMICtal     Take 100 mg by mouth every morning        olopatadine HCl 0.2 % Soln    PATADAY    1 Bottle    Place 1 drop into both eyes daily as needed (For itchy, water eyes.)    Allergic conjunctivitis, bilateral       paragard intrauterine copper     1 each    One device, intrauterine, for up to 10 years.    Encounter for IUD insertion       venlafaxine 150 MG 24 hr capsule    EFFEXOR-XR     Take 150 mg by mouth daily

## 2018-06-25 NOTE — PROGRESS NOTES
SOAP Note - Hand Therapy - Objective Information    Current Date:  6/25/2018  Diagnosis: L elbow strain, B CTS  Onset: 1 month ago  MD order:  6/6/18     Evan Dominguez is a 38 year old Right hand dominant female.    Patient reports symptoms of pain, stiffness/loss of motion, weakness/loss of strength, numbness and tingling  of the bilateral hand and left elbow which occurred due to unknown etiology for the hands; increased yard work for elbow pain.     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 The there is still a little numbness in the fingers when I wake up in the morning.  Functional changes noted by patient: Less pain with all tasks      O:  Pain Level Report  VAS(0-10) 6/11/2018 6/20/18 6/25/18   At Rest: 0 0/10 0/10   With Use: 2 2/10 1/10     Report of Pain:  Location: (L) elbow   Pain Quality:  Aching  Frequency: intermittent    Pain is worst:  Dependant on use and activity  Exacerbated by:  Lifting, pushing  Relieved by:  rest    Numbness/Tingling Level Report  VAS(0-10) 6/11/2018 6/20/18 6/25/18   At Rest: 0-1 0/10 0/10   At worst: 4 7/10 5/10   Night/morning  5/10 2/10   Location: B thumb, index, long and ring fingers, more noticeable in finger tips:  Worse in the morning  Edema:  NONE  Sensation: Decreased Median Nerve distribution and slight decreased to ulnar as well    ROM:  Wrist  6/11/2018    Left Right   Extension 84 80   Flexion 80 82   RD 14 11   UD 42 36   B elbow flex and ext WFL    Special Tests:  Date  6/11/2018   Side Left Right   Phalens ~13 seconds ~13 seconds   Tinels at CT neg positive   Tinels at Pronator neg neg   Median Nerve ULTT Distal tension with passive testing; active ~30% of range Distal tension with passive testing; active ~30% of range   Paresthesias Thumb, IF, LF and RF Thumb, IF, LF and RF   Resisted LF No pain nt   Radial Nerve ULTT neg nt     Palpation:  (tenderness  0-10/10)   Left 6/11/2018   LEP 0   Ext Wad 2   Ext origin 2   PIN 2     MMT:  (strength 0-5/5, pain 0-10/10)  Left 6/11/2018   Wrist ext 4+/5, 1/10   Wrist ext with elbow ext 4/5, 3/10   Elbow flex-neutral 5/5, 0   Elbow flex-sup 4+/5, 2/10   Elbow flex-pro 5/5, 0/10     Strength: (Measured in pounds, pain scale 0-10/10)    Date 6/11/2018      Trials Left Right Left Right Left Right Left Right   1 46.6 62.9         2           3           Avg           Pain 0 0           3 Point Pinch  Date 6/11/2018      Trials Left Right Left Right Left Right Left Right   1 12 11         2           3           Avg           Pain             Lateral Pinch  Date 6/11/2018      Trials Left Right Left Right Left Right Left Right   1 12* 13*         2           3           Avg           Pain           *mild collapse of MP noted    Please refer to the daily flowsheet for treatment provided today.     Home Exercise Program:  Wrist flex/ext stretches  Proximal and distal median nerve glides  Cont with OTC wrist braces at night, day PRN  Reviewed work station setup/typing, minimal education on posture  scap retraction   Chin tucks    Next Visit:  MFR  Patient to bring in OTC wrist braces, may need to fabricate custom  Passive nerve glides  Progress HEP as indicated; review position of hands for biking

## 2018-07-10 DIAGNOSIS — D50.9 IRON DEFICIENCY ANEMIA, UNSPECIFIED IRON DEFICIENCY ANEMIA TYPE: ICD-10-CM

## 2018-07-10 LAB
BASOPHILS # BLD AUTO: 0.1 10E9/L (ref 0–0.2)
BASOPHILS NFR BLD AUTO: 0.8 %
DIFFERENTIAL METHOD BLD: ABNORMAL
EOSINOPHIL # BLD AUTO: 1.1 10E9/L (ref 0–0.7)
EOSINOPHIL NFR BLD AUTO: 9.3 %
ERYTHROCYTE [DISTWIDTH] IN BLOOD BY AUTOMATED COUNT: 19.5 % (ref 10–15)
HCT VFR BLD AUTO: 33.8 % (ref 35–47)
HGB BLD-MCNC: 9.5 G/DL (ref 11.7–15.7)
IMM GRANULOCYTES # BLD: 0 10E9/L (ref 0–0.4)
IMM GRANULOCYTES NFR BLD: 0.3 %
LYMPHOCYTES # BLD AUTO: 3.5 10E9/L (ref 0.8–5.3)
LYMPHOCYTES NFR BLD AUTO: 29.5 %
MCH RBC QN AUTO: 19.9 PG (ref 26.5–33)
MCHC RBC AUTO-ENTMCNC: 28.1 G/DL (ref 31.5–36.5)
MCV RBC AUTO: 71 FL (ref 78–100)
MONOCYTES # BLD AUTO: 0.8 10E9/L (ref 0–1.3)
MONOCYTES NFR BLD AUTO: 6.4 %
NEUTROPHILS # BLD AUTO: 6.3 10E9/L (ref 1.6–8.3)
NEUTROPHILS NFR BLD AUTO: 53.7 %
PLATELET # BLD AUTO: 329 10E9/L (ref 150–450)
RBC # BLD AUTO: 4.77 10E12/L (ref 3.8–5.2)
WBC # BLD AUTO: 11.8 10E9/L (ref 4–11)

## 2018-07-10 PROCEDURE — 85025 COMPLETE CBC W/AUTO DIFF WBC: CPT | Performed by: FAMILY MEDICINE

## 2018-07-10 PROCEDURE — 36415 COLL VENOUS BLD VENIPUNCTURE: CPT | Performed by: FAMILY MEDICINE

## 2018-07-11 ENCOUNTER — THERAPY VISIT (OUTPATIENT)
Dept: OCCUPATIONAL THERAPY | Facility: CLINIC | Age: 38
End: 2018-07-11
Payer: COMMERCIAL

## 2018-07-11 DIAGNOSIS — M25.522 ELBOW PAIN, LEFT: ICD-10-CM

## 2018-07-11 DIAGNOSIS — M25.532 PAIN IN BOTH WRISTS: ICD-10-CM

## 2018-07-11 DIAGNOSIS — G56.03 BILATERAL CARPAL TUNNEL SYNDROME: ICD-10-CM

## 2018-07-11 DIAGNOSIS — R20.0 BILATERAL HAND NUMBNESS: ICD-10-CM

## 2018-07-11 DIAGNOSIS — M25.531 PAIN IN BOTH WRISTS: ICD-10-CM

## 2018-07-11 PROCEDURE — 97110 THERAPEUTIC EXERCISES: CPT | Mod: GO | Performed by: OCCUPATIONAL THERAPIST

## 2018-07-11 PROCEDURE — 97140 MANUAL THERAPY 1/> REGIONS: CPT | Mod: GO | Performed by: OCCUPATIONAL THERAPIST

## 2018-07-11 PROCEDURE — 97112 NEUROMUSCULAR REEDUCATION: CPT | Mod: GO | Performed by: OCCUPATIONAL THERAPIST

## 2018-07-11 NOTE — PROGRESS NOTES
SOAP Note - Hand Therapy - Objective Information    Current Date:  7/11/2018  Diagnosis: L elbow strain, B CTS  Onset: 1 month ago  MD order:  6/6/18     Evan Dominguez is a 38 year old Right hand dominant female.    Patient reports symptoms of pain, stiffness/loss of motion, weakness/loss of strength, numbness and tingling  of the bilateral hand and left elbow which occurred due to unknown etiology for the hands; increased yard work for elbow pain.     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  The right one still gets some tingling in the hand in the morning  Functional changes noted by patient: Less pain with all tasks      O:  Pain Level Report  VAS(0-10) 6/11/2018 6/20/18 6/25/18 7/11/18   At Rest: 0 0/10 0/10 0/10   With Use: 2 2/10 1/10 0/10     Report of Pain:  Location: (L) elbow   Pain Quality:  Aching  Frequency: intermittent    Pain is worst:  Dependant on use and activity  Exacerbated by:  Lifting, pushing  Relieved by:  rest    Numbness/Tingling Level Report  VAS(0-10) 6/11/2018 6/20/18 6/25/18   At Rest: 0-1 0/10 0/10   At worst: 4 7/10 5/10   Night/morning  5/10 2/10   Location: B thumb, index, long and ring fingers, more noticeable in finger tips:  Worse in the morning  Edema:  NONE  Sensation: Decreased Median Nerve distribution and slight decreased to ulnar as well  7/11/18: 2-3/10 numbness in the right hand in the morning    ROM:  Wrist  6/11/2018    Left Right   Extension 84 80   Flexion 80 82   RD 14 11   UD 42 36   B elbow flex and ext WFL    Special Tests:  Date  6/11/2018   Side Left Right   Phalens ~13 seconds ~13 seconds   Tinels at CT neg positive   Tinels at Pronator neg neg   Median Nerve ULTT Distal tension with passive testing; active ~30% of range Distal tension with passive testing; active ~30% of range   Paresthesias Thumb, IF, LF and RF Thumb, IF, LF and RF   Resisted LF  No pain nt   Radial Nerve ULTT neg nt     Palpation:  (tenderness 0-10/10)   Left 6/11/2018   LEP 0   Ext Wad 2   Ext origin 2   PIN 2     MMT:  (strength 0-5/5, pain 0-10/10)  Left 6/11/2018   Wrist ext 4+/5, 1/10   Wrist ext with elbow ext 4/5, 3/10   Elbow flex-neutral 5/5, 0   Elbow flex-sup 4+/5, 2/10   Elbow flex-pro 5/5, 0/10     Strength: (Measured in pounds, pain scale 0-10/10)    Date 6/11/2018 7/11/18     Trials Left Right Left Right Left Right Left Right   1 46.6 62.9 74 73       2   62 71       3   66 65       Avg   67 70       Pain 0 0           3 Point Pinch  Date 6/11/2018      Trials Left Right Left Right Left Right Left Right   1 12 11         2           3           Avg           Pain             Lateral Pinch  Date 6/11/2018      Trials Left Right Left Right Left Right Left Right   1 12* 13*         2           3           Avg           Pain           *mild collapse of MP noted    Please refer to the daily flowsheet for treatment provided today.     Home Exercise Program:  Wrist flex/ext stretches  Proximal and distal median nerve glides  Cont with OTC wrist braces at night, day PRN  Reviewed work station setup/typing, minimal education on posture  scap retraction   Chin tucks    Next Visit:  MFR  Passive nerve glides  Progress HEP as indicated

## 2018-07-11 NOTE — MR AVS SNAPSHOT
After Visit Summary   7/11/2018    Evan Dominguez    MRN: [id number]           Patient Information     Date Of Birth          1980        Visit Information        Provider Department      7/11/2018 7:00 AM Tiffanie Brown, OT [facility]        Today's Diagnoses     Pain in both wrists        Elbow pain, left        Bilateral carpal tunnel syndrome        Bilateral hand numbness           Follow-ups after your visit        Your next 10 appointments already scheduled     Aug 06, 2018  6:00 PM CDT   (Arrive by 5:45 PM)   New Patient Visit with Cricket Dougherty MD   [facility] ([facility])    [address] [place] [address]  3rd Floor  [facility] [address] [phone]              Who to contact     If you have questions or need follow up information about today's clinic visit or your schedule please contact [facility] directly at [phone].  Normal or non-critical lab and imaging results will be communicated to you by MyChart, letter or phone within 4 business days after the clinic has received the results. If you do not hear from us within 7 days, please contact the clinic through MyChart or phone. If you have a critical or abnormal lab result, we will notify you by phone as soon as possible.  Submit refill requests through [employer] or call your pharmacy and they will forward the refill request to us. Please allow 3 business days for your refill to be completed.          Additional Information About Your Visit        Care EveryWhere ID     This is your Care EveryWhere ID. This could be used by other organizations to access your [place] medical records  [id number]         Blood Pressure from Last 3 Encounters:   06/06/18 116/75   09/12/17 118/74   08/29/17 116/83    Weight from Last 3 Encounters:   06/06/18 110.7 kg (244 lb 1.6 oz)   06/28/17 109.3 kg (240 lb 14.4 oz)   09/29/16 106.3 kg (234 lb 6.4 oz)              We Performed  the Following     MANUAL THER TECH,1+REGIONS,EA 15 MIN     NEUROMUSCULAR RE-EDUCATION     THERAPEUTIC EXERCISES        Primary Care Provider Office Phone # Fax #    Pablo Mcdowell [phone] [address]  [facility] [address]        Equal Access to Services     [facility]: Hadii aad ku hadasho Soomaali, waaxda luqadaha, qaybta kaalmada adeegyada, waxay idiin hayaan adechris aprilabby ladonta . So [facility] [phone].    ATENCIÓN: Si habla español, tiene a rogers disposición servicios gratuitos de asistencia lingüística. Llame al [phone].    We comply with applicable federal civil rights laws and [place] laws. We do not discriminate on the basis of race, color, national origin, age, disability, sex, sexual orientation, or gender identity.            Thank you!     Thank you for choosing [facility]  for your care. Our goal is always to provide you with excellent care. Hearing back from our patients is one way we can continue to improve our services. Please take a few minutes to complete the written survey that you may receive in the mail after your visit with us. Thank you!             Your Updated Medication List - Protect others around you: Learn how to safely use, store and throw away your medicines at www.disposemymeds.org.          This list is accurate as of 7/11/18  8:43 AM.  Always use your most recent med list.                   Brand Name Dispense Instructions for use Diagnosis    albuterol 108 (90 Base) MCG/ACT Inhaler    PROAIR HFA/PROVENTIL HFA/VENTOLIN HFA    1 Inhaler    Inhale 2 puffs into the lungs every 6 hours as needed for shortness of breath / dyspnea.    Intermittent asthma       ciclopirox 0.77 % cream    LOPROX    90 g    Apply topically 2 times daily To feet and affected toenail.    Tinea pedis of both feet, Dermatophytosis of nail       ferrous gluconate 324 (38 Fe) MG tablet    FERGON    100 tablet    Take 1 tablet (324 mg) by mouth daily (with  breakfast)    Iron deficiency anemia, unspecified iron deficiency anemia type       lamoTRIgine 100 MG tablet    LaMICtal     Take 100 mg by mouth every morning        olopatadine HCl 0.2 % Soln    PATADAY    1 Bottle    Place 1 drop into both eyes daily as needed (For itchy, water eyes.)    Allergic conjunctivitis, bilateral       paragard intrauterine copper     1 each    One device, intrauterine, for up to 10 years.    Encounter for IUD insertion       venlafaxine 150 MG 24 hr capsule    EFFEXOR-XR     Take 150 mg by mouth daily

## 2018-07-12 ENCOUNTER — TELEPHONE (OUTPATIENT)
Dept: PEDIATRICS | Facility: CLINIC | Age: 38
End: 2018-07-12

## 2018-07-12 NOTE — TELEPHONE ENCOUNTER
Patient had CBC with Platelets Differential completed on 07/10/18 ordered by Dr. Mcdowell. Results have not been reviewed at this time. Routing to Primary Care provider pool for review.  Radha Palmer, CMA

## 2018-07-12 NOTE — TELEPHONE ENCOUNTER
[facility] Call Center    Phone Message    May a detailed message be left on voicemail: yes    Reason for Call: Other: specifically the hemaglobin- patient is requesting lab work results from tuesday morning     Action Taken: Message routed to:  Primary Care p [phone]

## 2018-07-12 NOTE — TELEPHONE ENCOUNTER
Results for orders placed or performed in visit on 07/10/18   CBC with platelets and differential   Result Value Ref Range    WBC 11.8 (H) 4.0 - 11.0 10e9/L    RBC Count 4.77 3.8 - 5.2 10e12/L    Hemoglobin 9.5 (L) 11.7 - 15.7 g/dL    Hematocrit 33.8 (L) 35.0 - 47.0 %    MCV 71 (L) 78 - 100 fl    MCH 19.9 (L) 26.5 - 33.0 pg    MCHC 28.1 (L) 31.5 - 36.5 g/dL    RDW 19.5 (H) 10.0 - 15.0 %    Platelet Count 329 150 - 450 10e9/L    Diff Method Automated Method     % Neutrophils 53.7 %    % Lymphocytes 29.5 %    % Monocytes 6.4 %    % Eosinophils 9.3 %    % Basophils 0.8 %    % Immature Granulocytes 0.3 %    Absolute Neutrophil 6.3 1.6 - 8.3 10e9/L    Absolute Lymphocytes 3.5 0.8 - 5.3 10e9/L    Absolute Monocytes 0.8 0.0 - 1.3 10e9/L    Absolute Eosinophils 1.1 (H) 0.0 - 0.7 10e9/L    Absolute Basophils 0.1 0.0 - 0.2 10e9/L    Abs Immature Granulocytes 0.0 0 - 0.4 10e9/L     Please let her know her hemoglobin is improved but still anemic but her WBC is also slightly elevated   Would recheck again in one months

## 2018-07-12 NOTE — TELEPHONE ENCOUNTER
Informed patient of lab results.  Patient verbalized understanding.    Lois Hurtado RN,   [facility]

## 2018-07-19 ENCOUNTER — OFFICE VISIT (OUTPATIENT)
Dept: OPTOMETRY | Facility: CLINIC | Age: 38
End: 2018-07-19
Payer: COMMERCIAL

## 2018-07-19 DIAGNOSIS — H10.13 ALLERGIC CONJUNCTIVITIS, BILATERAL: ICD-10-CM

## 2018-07-19 DIAGNOSIS — H52.13 MYOPIA, BILATERAL: Primary | ICD-10-CM

## 2018-07-19 PROCEDURE — 92014 COMPRE OPH EXAM EST PT 1/>: CPT | Performed by: OPTOMETRIST

## 2018-07-19 PROCEDURE — 92015 DETERMINE REFRACTIVE STATE: CPT | Performed by: OPTOMETRIST

## 2018-07-19 RX ORDER — OLOPATADINE HYDROCHLORIDE 2 MG/ML
1 SOLUTION/ DROPS OPHTHALMIC DAILY PRN
Qty: 1 BOTTLE | Refills: 6 | Status: SHIPPED | OUTPATIENT
Start: 2018-07-19

## 2018-07-19 ASSESSMENT — REFRACTION_MANIFEST
OD_SPHERE: -1.00
METHOD_AUTOREFRACTION: 1
OD_SPHERE: -1.50
OD_CYLINDER: +0.50
OS_SPHERE: -1.00
OD_AXIS: 094
OS_SPHERE: -1.00

## 2018-07-19 ASSESSMENT — VISUAL ACUITY
OD_CC: 20/20
OD_SC: 20/60
OD_CC+: -1
OD_CC: 20/20
OS_CC: 20/20
CORRECTION_TYPE: GLASSES
OS_SC: 20/50
METHOD: SNELLEN - LINEAR
OS_SC+: -1

## 2018-07-19 ASSESSMENT — REFRACTION_WEARINGRX
OD_SPHERE: -1.00
SPECS_TYPE: SVL
OS_SPHERE: -1.00
OS_CYLINDER: SPHERE
OD_CYLINDER: SPHERE

## 2018-07-19 ASSESSMENT — SLIT LAMP EXAM - LIDS
COMMENTS: 1+ MEIBOMIAN GLAND DYSFUNCTION
COMMENTS: 1+ MEIBOMIAN GLAND DYSFUNCTION

## 2018-07-19 ASSESSMENT — CONF VISUAL FIELD
OD_NORMAL: 1
OS_NORMAL: 1
METHOD: COUNTING FINGERS

## 2018-07-19 ASSESSMENT — TONOMETRY
OD_IOP_MMHG: 15
IOP_METHOD: APPLANATION
OS_IOP_MMHG: 12

## 2018-07-19 ASSESSMENT — EXTERNAL EXAM - LEFT EYE: OS_EXAM: NORMAL

## 2018-07-19 ASSESSMENT — EXTERNAL EXAM - RIGHT EYE: OD_EXAM: NORMAL

## 2018-07-19 ASSESSMENT — CUP TO DISC RATIO
OS_RATIO: 0.2
OD_RATIO: 0.2

## 2018-07-19 NOTE — PATIENT INSTRUCTIONS
There was no change in the prescription for your glasses.    Use the Pataday drops once a day in both eyes as needed for itching.    Your eyes may be blurry at near and sensitive to light for several hours from the dilating drops.    Yearly eye exams recommended.    Thank you!

## 2018-07-19 NOTE — PROGRESS NOTES
Chief Complaint   Patient presents with     COMPREHENSIVE EYE EXAM         Last Eye Exam: 2/2016  Dilated Previously: Yes    What are you currently using to see?  glasses       Distance Vision Acuity: Satisfied with vision    Near Vision Acuity: Satisfied with vision while reading  with glasses and unaided    Eye Comfort: itchy and allergy bumps  Do you use eye drops? 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       OBJECTIVE: See Ophthalmology exam    ASSESSMENT:    ICD-10-CM    1. Myopia, bilateral H52.13 EYE EXAM (SIMPLE-NONBILLABLE)     REFRACTION   2. Allergic conjunctivitis, bilateral H10.13 olopatadine HCl (PATADAY) 0.2 % SOLN     EYE EXAM (SIMPLE-NONBILLABLE)      PLAN:     Patient Instructions   There was no change in the prescription for your glasses.    Use the Pataday drops once a day in both eyes as needed for itching.    Your eyes may be blurry at near and sensitive to light for several hours from the dilating drops.    Yearly eye exams recommended.    Thank you!

## 2018-07-19 NOTE — MR AVS SNAPSHOT
After Visit Summary   7/19/2018    Evan Dominguez    MRN: [id number]           Patient Information     Date Of Birth          1980        Visit Information        Provider Department      7/19/2018 8:40 AM Laura Morales OD [facility]        Today's Diagnoses     Myopia, bilateral    -  1    Allergic conjunctivitis, bilateral          Care Instructions    There was no change in the prescription for your glasses.    Use the Pataday drops once a day in both eyes as needed for itching.    Your eyes may be blurry at near and sensitive to light for several hours from the dilating drops.    Yearly eye exams recommended.    Thank you!          Follow-ups after your visit        Your next 10 appointments already scheduled     Aug 06, 2018  6:00 PM CDT   (Arrive by 5:45 PM)   New Patient Visit with Cricket Dougherty MD   [facility] ([facility])    [address] [place] [id number] [phone]              Who to contact     If you have questions or need follow up information about today's clinic visit or your schedule please contact [facility] directly at [phone].  Normal or non-critical lab and imaging results will be communicated to you by MyChart, letter or phone within 4 business days after the clinic has received the results. If you do not hear from us within 7 days, please contact the clinic through MyChart or phone. If you have a critical or abnormal lab result, we will notify you by phone as soon as possible.  Submit refill requests through [employer] or call your pharmacy and they will forward the refill request to us. Please allow 3 business days for your refill to be completed.          Additional Information About Your Visit        MyChart Information     [employer] gives you secure access to your electronic health record. If you see a primary care provider, you can also send messages  to your care team and make appointments. If you have questions, please call your primary care clinic.  If you do not have a primary care provider, please call [phone] and they will assist you.        Care EveryWhere ID     This is your Care EveryWhere ID. This could be used by other organizations to access your [place] medical records  [id number]         Blood Pressure from Last 3 Encounters:   06/06/18 116/75   09/12/17 118/74   08/29/17 116/83    Weight from Last 3 Encounters:   06/06/18 110.7 kg (244 lb 1.6 oz)   06/28/17 109.3 kg (240 lb 14.4 oz)   09/29/16 106.3 kg (234 lb 6.4 oz)              We Performed the Following     EYE EXAM (SIMPLE-NONBILLABLE)     REFRACTION          Where to get your medicines      These medications [facility] [place] [address] [place] [address] [facility] [address]     Phone:  [phone]     olopatadine HCl 0.2 % Soln          Primary Care Provider Office Phone # Fax #    Pablo Mcdowell [phone]       [address]  [facility] [address]        Equal Access to Services     ERIC PIKE : Hadii edward ku hadasho Soomaali, waaxda luqadaha, qaybta kaalmada adeegyada, waxay idiin hayabena pimentel. So [facility] [phone].    ATENCIÓN: Si habla español, tiene a rogers disposición servicios gratuitos de asistencia lingüística. Llame al [phone].    We comply with applicable federal civil rights laws and [place] laws. We do not discriminate on the basis of race, color, national origin, age, disability, sex, sexual orientation, or gender identity.            Thank you!     Thank you for choosing [facility]  for your care. Our goal is always to provide you with excellent care. Hearing back from our patients is one way we can continue to improve our services. Please take a few minutes to complete the written survey that you may receive in the mail after your visit with us. Thank  you!             Your Updated Medication List - Protect others around you: Learn how to safely use, store and throw away your medicines at www.disposemymeds.org.          This list is accurate as of 7/19/18  9:55 AM.  Always use your most recent med list.                   Brand Name Dispense Instructions for use Diagnosis    albuterol 108 (90 Base) MCG/ACT Inhaler    PROAIR HFA/PROVENTIL HFA/VENTOLIN HFA    1 Inhaler    Inhale 2 puffs into the lungs every 6 hours as needed for shortness of breath / dyspnea.    Intermittent asthma       ciclopirox 0.77 % cream    LOPROX    90 g    Apply topically 2 times daily To feet and affected toenail.    Tinea pedis of both feet, Dermatophytosis of nail       ferrous gluconate 324 (38 Fe) MG tablet    FERGON    100 tablet    Take 1 tablet (324 mg) by mouth daily (with breakfast)    Iron deficiency anemia, unspecified iron deficiency anemia type       lamoTRIgine 100 MG tablet    LaMICtal     Take 100 mg by mouth every morning        olopatadine HCl 0.2 % Soln    PATADAY    1 Bottle    Place 1 drop into both eyes daily as needed (For itchy, water eyes.)    Allergic conjunctivitis, bilateral       paragard intrauterine copper     1 each    One device, intrauterine, for up to 10 years.    Encounter for IUD insertion       venlafaxine 150 MG 24 hr capsule    EFFEXOR-XR     Take 150 mg by mouth daily        VITAMIN C PO

## 2018-07-19 NOTE — LETTER
7/19/2018         RE: Evan Dominguez  [address] [address]        Dear Colleague,    Thank you for referring your patient, Evan Dominguez, to the [facility]. Please see a copy of my visit note below.    Chief Complaint   Patient presents with     COMPREHENSIVE EYE EXAM         Last Eye Exam: 2/2016  Dilated Previously: Yes    What are you currently using to see?  glasses       Distance Vision Acuity: Satisfied with vision    Near Vision Acuity: Satisfied with vision while reading  with glasses and unaided    Eye Comfort: itchy and allergy bumps  Do you use eye drops? 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       OBJECTIVE: See Ophthalmology exam    ASSESSMENT:    ICD-10-CM    1. Myopia, bilateral H52.13 EYE EXAM (SIMPLE-NONBILLABLE)     REFRACTION   2. Allergic conjunctivitis, bilateral H10.13 olopatadine HCl (PATADAY) 0.2 % SOLN     EYE EXAM (SIMPLE-NONBILLABLE)      PLAN:     Patient Instructions   There was no change in the prescription for your glasses.    Use the Pataday drops once a day in both eyes as needed for itching.    Your eyes may be blurry at near and sensitive to light for several hours from the dilating drops.    Yearly eye exams recommended.    Thank you!         Again, thank you for allowing me to participate in the care of your patient.        Sincerely,        Laura Morales OD

## 2018-07-30 ENCOUNTER — TELEPHONE (OUTPATIENT)
Dept: DERMATOLOGY | Facility: CLINIC | Age: 38
End: 2018-07-30

## 2018-07-30 NOTE — TELEPHONE ENCOUNTER
Left message for Evan reminding of appointment date and time. Asked that they bring an updated list of medications and any records pertaining to this visit.     Clinic number provided to call back in case this appointment needs to be canceled.

## 2018-08-06 ENCOUNTER — OFFICE VISIT (OUTPATIENT)
Dept: DERMATOLOGY | Facility: CLINIC | Age: 38
End: 2018-08-06
Payer: COMMERCIAL

## 2018-08-06 ENCOUNTER — DOCUMENTATION ONLY (OUTPATIENT)
Dept: LAB | Facility: CLINIC | Age: 38
End: 2018-08-06

## 2018-08-06 DIAGNOSIS — D23.71 DERMATOFIBROMA OF RIGHT LOWER EXTREMITY: Primary | ICD-10-CM

## 2018-08-06 DIAGNOSIS — D64.9 LOW HEMOGLOBIN: Primary | ICD-10-CM

## 2018-08-06 ASSESSMENT — PAIN SCALES - GENERAL
PAINLEVEL: NO PAIN (0)
PAINLEVEL: MILD PAIN (2)

## 2018-08-06 NOTE — LETTER
2018       RE: Evan Dominguez  [address] [facility] [address] [facility] [address]     Dear Colleague,    Thank you for referring your patient, Evan Dominguez, to the [facility] DERMATOLOGY at [facility]. Please see a copy of my visit note below.    [facility] Dermatology Note      Dermatology Problem List:    Specialty Problems        Dermatology Diagnoses    Meibomian gland dysfunction        Ingrown nail        Nail dystrophy        Bilateral hand numbness            CC:   Derm Problem (Evan has a bump on her right knee that she would like to have looked at. )    Encounter Date: Aug 6, 2018    History of Present Illness:  Ms. Evan Dominguez is a 38 year old female who presents as a referral from [facility].  On the right knee since 1 year a 3mm diameter dermatofibroma. Patient bothered, because it hurts her every times she kneels down. Patient has several other similar lesions without pain over soft tissue.    Past Medical History:   Patient Active Problem List   Diagnosis     CARDIOVASCULAR SCREENING; LDL GOAL LESS THAN 160     Asthma, exercise induced     H/O kidney donation     Radial styloid tenosynovitis     Moderate major depression (H)     Meibomian gland dysfunction     Allergic conjunctivitis     Family history of diabetes mellitus     Previous  delivery, antepartum condition or complication     Asymptomatic bacteriuria in pregnancy in second trimester     Need for Tdap vaccination     Beta hemolytic streptococcus urinary tract infection affecting pregnancy     Excessive or frequent menstruation     Flat feet, bilateral     Ingrown nail     Nail dystrophy     Bilateral carpal tunnel syndrome     Bilateral hand numbness     Morbid obesity with BMI of 40.0-44.9, adult (H)     S/p nephrectomy, kidney donor     Adverse effect of drug, sequela, h/o dystonia     Pain in both wrists     Elbow pain, left     Past Medical History:   Diagnosis  Date     Abnormal Pap smear 2006    resolved     Asthma     Mild;  extreme cold or heavy exercises     Asymptomatic bacteriuria in pregnancy in second trimester 4/16/2015    Beta strep isolated.      Depression 2000     Migraine headache      Postpartum hypertension 2013    no magnesium sulfate used.       Solitary kidney 2000    S/P Donated, Left       Allergy History:     Allergies   Allergen Reactions     Contrast Dye      Congestion and sneezing with IVP dye     Thimerosol [Thimerosal]      Swelling and redness with eye drops       Social History:    Reports that she has never smoked. She has never used smokeless tobacco. She reports that she drinks alcohol. She reports that she does not use illicit drugs.    Family History:  Family History   Problem Relation Age of Onset     Diabetes Mother      Pre Diabetic     Thyroid Disease Mother      Hypertension Father      Diabetes Maternal Grandmother      HEART DISEASE Maternal Grandfather      Diabetes Paternal Grandmother      HEART DISEASE Paternal Grandfather      Cancer No family hx of      Cerebrovascular Disease No family hx of      Glaucoma No family hx of      Macular Degeneration No family hx of      Skin Cancer No family hx of      Melanoma No family hx of        Medications:  Current Outpatient Prescriptions   Medication Sig Dispense Refill     albuterol (PROAIR HFA, PROVENTIL HFA, VENTOLIN HFA) 108 (90 BASE) MCG/ACT inhaler Inhale 2 puffs into the lungs every 6 hours as needed for shortness of breath / dyspnea. 1 Inhaler 1     Ascorbic Acid (VITAMIN C PO)        ciclopirox (LOPROX) 0.77 % cream Apply topically 2 times daily To feet and affected toenail. 90 g 6     ferrous gluconate (FERGON) 324 (38 Fe) MG tablet Take 1 tablet (324 mg) by mouth daily (with breakfast) 100 tablet 1     lamoTRIgine (LAMICTAL) 100 MG tablet Take 100 mg by mouth every morning  1     olopatadine HCl (PATADAY) 0.2 % SOLN Place 1 drop into both eyes daily as needed (For itchy,  water eyes.) 1 Bottle 6     paragard intrauterine copper One device, intrauterine, for up to 10 years. 1 each      venlafaxine (EFFEXOR-XR) 150 MG 24 hr capsule Take 150 mg by mouth daily  0           Review of Systems:  -As per HPI  -Constitutional: The patient denies fatigue, fevers, chills, unintended weight loss, and night sweats.  -HEENT: Patient denies nonhealing oral sores.  -Skin: As above in HPI. No additional skin concerns.    Physical exam:  Vitals: There were no vitals taken for this visit.  GEN: This is a well developed, well-nourished female in no acute distress, in a pleasant mood.    SKIN: Focused examination of the knee was performed.  Over the right knee a 3mm firm exophytic tumor = dermatofibroma.  More other dermatofibromas on arms and legs, but all of them are over soft tissue and not painful. Only that on over the knee disturbs the patient    -No other lesions of concern on areas examined.     Impression/Plan:    1) Dermatofibroma right knee    Explained to patient that benign lesion, but patient wants to remove it because of pain if kneeling on it. Informed patient that healing on knee sometimes delayed and danger of scar formation.  Punch biopsy:  After discussion of benefits and risks including but not limited to bleeding/bruising, pain/swelling, infection, scar, incomplete removal, nerve damage/numbness, recurrence, and non-diagnostic biopsy, written consent, verbal consent and photographs were obtained. Time-out was performed. The area was cleaned with isopropyl alcohol. 0.5mL of 1% lidocaine with epinephrine was injected to obtain adequate anesthesia of the lesion on the right knee. A 4 mm punch biopsy was performed.  4-0 prolene sutures were utilized to approximate the epidermal edges.  White petroleum jelly/VaselineTM and a bandage was applied to the wound.  Explicit verbal and written wound care instructions were provided.  The patient left the Dermatology Clinic in good condition. The  patient was counseled to follow up for suture removal in approximately 14 days.        Follow-up if necessary    Again, thank you for allowing me to participate in the care of your patient.      Sincerely,    Cricket Dougherty MD

## 2018-08-06 NOTE — PROGRESS NOTES
[facility] Dermatology Note      Dermatology Problem List:    Specialty Problems        Dermatology Diagnoses    Meibomian gland dysfunction        Ingrown nail        Nail dystrophy        Bilateral hand numbness              CC:   Derm Problem (Evan has a bump on her right knee that she would like to have looked at. )        Encounter Date: Aug 6, 2018    History of Present Illness:  Ms. Evan Dominguez is a 38 year old female who presents as a referral from [facility].  On the right knee since 1 year a 3mm diameter dermatofibroma. Patient bothered, because it hurts her every times she kneels down. Patient has several other similar lesions without pain over soft tissue.    Past Medical History:   Patient Active Problem List   Diagnosis     CARDIOVASCULAR SCREENING; LDL GOAL LESS THAN 160     Asthma, exercise induced     H/O kidney donation     Radial styloid tenosynovitis     Moderate major depression (H)     Meibomian gland dysfunction     Allergic conjunctivitis     Family history of diabetes mellitus     Previous  delivery, antepartum condition or complication     Asymptomatic bacteriuria in pregnancy in second trimester     Need for Tdap vaccination     Beta hemolytic streptococcus urinary tract infection affecting pregnancy     Excessive or frequent menstruation     Flat feet, bilateral     Ingrown nail     Nail dystrophy     Bilateral carpal tunnel syndrome     Bilateral hand numbness     Morbid obesity with BMI of 40.0-44.9, adult (H)     S/p nephrectomy, kidney donor     Adverse effect of drug, sequela, h/o dystonia     Pain in both wrists     Elbow pain, left     Past Medical History:   Diagnosis Date     Abnormal Pap smear     resolved     Asthma     Mild;  extreme cold or heavy exercises     Asymptomatic bacteriuria in pregnancy in second trimester 2015    Beta strep isolated.      Depression      Migraine headache      Postpartum hypertension 2013    no  magnesium sulfate used.       Solitary kidney 2000    S/P Donated, Left       Allergy History:     Allergies   Allergen Reactions     Contrast Dye      Congestion and sneezing with IVP dye     Thimerosol [Thimerosal]      Swelling and redness with eye drops       Social History:     reports that she has never smoked. She has never used smokeless tobacco. She reports that she drinks alcohol. She reports that she does not use illicit drugs.      Family History:  Family History   Problem Relation Age of Onset     Diabetes Mother      Pre Diabetic     Thyroid Disease Mother      Hypertension Father      Diabetes Maternal Grandmother      HEART DISEASE Maternal Grandfather      Diabetes Paternal Grandmother      HEART DISEASE Paternal Grandfather      Cancer No family hx of      Cerebrovascular Disease No family hx of      Glaucoma No family hx of      Macular Degeneration No family hx of      Skin Cancer No family hx of      Melanoma No family hx of        Medications:  Current Outpatient Prescriptions   Medication Sig Dispense Refill     albuterol (PROAIR HFA, PROVENTIL HFA, VENTOLIN HFA) 108 (90 BASE) MCG/ACT inhaler Inhale 2 puffs into the lungs every 6 hours as needed for shortness of breath / dyspnea. 1 Inhaler 1     Ascorbic Acid (VITAMIN C PO)        ciclopirox (LOPROX) 0.77 % cream Apply topically 2 times daily To feet and affected toenail. 90 g 6     ferrous gluconate (FERGON) 324 (38 Fe) MG tablet Take 1 tablet (324 mg) by mouth daily (with breakfast) 100 tablet 1     lamoTRIgine (LAMICTAL) 100 MG tablet Take 100 mg by mouth every morning  1     olopatadine HCl (PATADAY) 0.2 % SOLN Place 1 drop into both eyes daily as needed (For itchy, water eyes.) 1 Bottle 6     paragard intrauterine copper One device, intrauterine, for up to 10 years. 1 each      venlafaxine (EFFEXOR-XR) 150 MG 24 hr capsule Take 150 mg by mouth daily  0           Review of Systems:  -As per HPI  -Constitutional: The patient denies fatigue,  fevers, chills, unintended weight loss, and night sweats.  -HEENT: Patient denies nonhealing oral sores.  -Skin: As above in HPI. No additional skin concerns.    Physical exam:  Vitals: There were no vitals taken for this visit.  GEN: This is a well developed, well-nourished female in no acute distress, in a pleasant mood.    SKIN: Focused examination of the knee was performed.  Over the right knee a 3mm firm exophytic tumor = dermatofibroma.  More other dermatofibromas on arms and legs, but all of them are over soft tissue and not painful. Only that on over the knee disturbs the patient    -No other lesions of concern on areas examined.     Impression/Plan:    1) Dermatofibroma right knee    Explained to patient that benign lesion, but patient wants to remove it because of pain if kneeling on it. Informed patient that healing on knee sometimes delayed and danger of scar formation.  Punch biopsy:  After discussion of benefits and risks including but not limited to bleeding/bruising, pain/swelling, infection, scar, incomplete removal, nerve damage/numbness, recurrence, and non-diagnostic biopsy, written consent, verbal consent and photographs were obtained. Time-out was performed. The area was cleaned with isopropyl alcohol. 0.5mL of 1% lidocaine with epinephrine was injected to obtain adequate anesthesia of the lesion on the right knee. A 4 mm punch biopsy was performed.  4-0 prolene sutures were utilized to approximate the epidermal edges.  White petroleum jelly/VaselineTM and a bandage was applied to the wound.  Explicit verbal and written wound care instructions were provided.  The patient left the Dermatology Clinic in good condition. The patient was counseled to follow up for suture removal in approximately 14 days.        Follow-up if necessary

## 2018-08-06 NOTE — PROGRESS NOTES
Labs pended. Routed to PCP to review and order.    Lois Hurtado RN,   [facility]      Sheyla Hubbarda, RAMÓN CNP        7/12/18 2:52 PM   Note            Results for orders placed or performed in visit on 07/10/18   CBC with platelets and differential   Result Value Ref Range     WBC 11.8 (H) 4.0 - 11.0 10e9/L     RBC Count 4.77 3.8 - 5.2 10e12/L     Hemoglobin 9.5 (L) 11.7 - 15.7 g/dL     Hematocrit 33.8 (L) 35.0 - 47.0 %     MCV 71 (L) 78 - 100 fl     MCH 19.9 (L) 26.5 - 33.0 pg     MCHC 28.1 (L) 31.5 - 36.5 g/dL     RDW 19.5 (H) 10.0 - 15.0 %     Platelet Count 329 150 - 450 10e9/L     Diff Method Automated Method       % Neutrophils 53.7 %     % Lymphocytes 29.5 %     % Monocytes 6.4 %     % Eosinophils 9.3 %     % Basophils 0.8 %     % Immature Granulocytes 0.3 %     Absolute Neutrophil 6.3 1.6 - 8.3 10e9/L     Absolute Lymphocytes 3.5 0.8 - 5.3 10e9/L     Absolute Monocytes 0.8 0.0 - 1.3 10e9/L     Absolute Eosinophils 1.1 (H) 0.0 - 0.7 10e9/L     Absolute Basophils 0.1 0.0 - 0.2 10e9/L     Abs Immature Granulocytes 0.0 0 - 0.4 10e9/L      Please let her know her hemoglobin is improved but still anemic but her WBC is also slightly elevated   Would recheck again in one months            Lois Hurtado RN,   [facility]

## 2018-08-06 NOTE — MR AVS SNAPSHOT
After Visit Summary   8/6/2018    Evan Dominguez    MRN: [id number]           Patient Information     Date Of Birth          1980        Visit Information        Provider Department      8/6/2018 6:00 PM Cricket Dougherty MD [facility] Dermatology        Today's Diagnoses     Dermatofibroma of right lower extremity    -  1       Follow-ups after your visit        Your next 10 appointments already scheduled     Aug 07, 2018  7:30 AM CDT   LAB with LAB FIRST FLOOR Formerly [facility] ([facility])    [address] [place] [id number] [phone]           Please do not eat 10-12 hours before your appointment if you are coming in fasting for labs on lipids, cholesterol, or glucose (sugar). This does not apply to pregnant women. Water, hot tea and black coffee (with nothing added) are okay. Do not drink other fluids, diet soda or chew gum.              Future tests that were ordered for you today     Open Future Orders        Priority Expected Expires Ordered    CBC with platelets and differential Routine 8/6/2018 9/6/2018 8/6/2018    Iron and iron binding capacity Routine 8/6/2018 9/6/2018 8/6/2018    Ferritin Routine 8/6/2018 9/6/2018 8/6/2018            Who to contact     Please call your clinic at [phone] to:    Ask questions about your health    Make or cancel appointments    Discuss your medicines    Learn about your test results    Speak to your doctor            Additional Information About Your Visit        [employer] Information     [employer] gives you secure access to your electronic health record. If you see a primary care provider, you can also send messages to your care team and make appointments. If you have questions, please call your primary care clinic.  If you do not have a primary care provider, please call [phone] and they will assist you.      [employer] is an electronic gateway that provides easy, online access to your  medical records. With [employer], you can request a clinic appointment, read your test results, renew a prescription or communicate with your care team.     To access your existing account, please contact your [facility] or call [phone] for assistance.        Care EveryWhere ID     This is your Care EveryWhere ID. This could be used by other organizations to access your [place] medical records  [id number]         Blood Pressure from Last 3 Encounters:   06/06/18 116/75   09/12/17 118/74   08/29/17 116/83    Weight from Last 3 Encounters:   06/06/18 110.7 kg (244 lb 1.6 oz)   06/28/17 109.3 kg (240 lb 14.4 oz)   09/29/16 106.3 kg (234 lb 6.4 oz)              We Performed the Following     BIOPSY SKIN/SUBQ/MUC MEM, SINGLE LESION     Dermatological path order and indications        Primary Care Provider Office Phone # Fax #    Pablo Mcdowell [phone] [address] [facility] [address]        Equal Access to Services     [facility]: Hadii aad ku hadasho Soomaali, waaxda luqadaha, qaybta kaalmada adeegyasharon, gregory pham . So [facility] [phone].    ATENCIÓN: Si habla español, tiene a rogers disposición servicios gratuitos de asistencia lingüística. Llame al [phone].    We comply with applicable federal civil rights laws and [place] laws. We do not discriminate on the basis of race, color, national origin, age, disability, sex, sexual orientation, or gender identity.            Thank you!     Thank you for choosing [facility] DERMATOLOGY  for your care. Our goal is always to provide you with excellent care. Hearing back from our patients is one way we can continue to improve our services. Please take a few minutes to complete the written survey that you may receive in the mail after your visit with us. Thank you!             Your Updated Medication List - Protect others around you: Learn how to safely use, store and throw  away your medicines at www.disposemymeds.org.          This list is accurate as of 8/6/18  6:32 PM.  Always use your most recent med list.                   Brand Name Dispense Instructions for use Diagnosis    albuterol 108 (90 Base) MCG/ACT Inhaler    PROAIR HFA/PROVENTIL HFA/VENTOLIN HFA    1 Inhaler    Inhale 2 puffs into the lungs every 6 hours as needed for shortness of breath / dyspnea.    Intermittent asthma       ciclopirox 0.77 % cream    LOPROX    90 g    Apply topically 2 times daily To feet and affected toenail.    Tinea pedis of both feet, Dermatophytosis of nail       ferrous gluconate 324 (38 Fe) MG tablet    FERGON    100 tablet    Take 1 tablet (324 mg) by mouth daily (with breakfast)    Iron deficiency anemia, unspecified iron deficiency anemia type       lamoTRIgine 100 MG tablet    LaMICtal     Take 100 mg by mouth every morning        olopatadine HCl 0.2 % Soln    PATADAY    1 Bottle    Place 1 drop into both eyes daily as needed (For itchy, water eyes.)    Allergic conjunctivitis, bilateral       paragard intrauterine copper     1 each    One device, intrauterine, for up to 10 years.    Encounter for IUD insertion       venlafaxine 150 MG 24 hr capsule    EFFEXOR-XR     Take 150 mg by mouth daily        VITAMIN C PO

## 2018-08-06 NOTE — NURSING NOTE
Dermatology Rooming Note    Evan MILI Dominguez's goals for this visit include:   Chief Complaint   Patient presents with     Derm Problem     Evan has a bump on her right knee that she would like to have looked at.        Beverly Chavez, EMT

## 2018-08-06 NOTE — PATIENT INSTRUCTIONS

## 2018-08-06 NOTE — NURSING NOTE
Lidocaine 1 % injection   3mL once for one use, starting 8/6/2018 ending 8/6/2018,  2mL disp, R-0, injection  Injected by Tana Polk LPN

## 2018-08-07 ENCOUNTER — OFFICE VISIT (OUTPATIENT)
Dept: FAMILY MEDICINE | Facility: CLINIC | Age: 38
End: 2018-08-07
Payer: COMMERCIAL

## 2018-08-07 VITALS
RESPIRATION RATE: 20 BRPM | SYSTOLIC BLOOD PRESSURE: 125 MMHG | HEART RATE: 92 BPM | OXYGEN SATURATION: 97 % | DIASTOLIC BLOOD PRESSURE: 82 MMHG | HEIGHT: 65 IN | TEMPERATURE: 98.4 F | BODY MASS INDEX: 40.84 KG/M2 | WEIGHT: 245.1 LBS

## 2018-08-07 DIAGNOSIS — D72.829 LEUKOCYTOSIS, UNSPECIFIED TYPE: ICD-10-CM

## 2018-08-07 DIAGNOSIS — D64.9 LOW HEMOGLOBIN: ICD-10-CM

## 2018-08-07 DIAGNOSIS — J01.90 ACUTE SINUSITIS WITH SYMPTOMS > 10 DAYS: ICD-10-CM

## 2018-08-07 DIAGNOSIS — D50.9 IRON DEFICIENCY ANEMIA, UNSPECIFIED IRON DEFICIENCY ANEMIA TYPE: Primary | ICD-10-CM

## 2018-08-07 DIAGNOSIS — R07.0 THROAT PAIN: Primary | ICD-10-CM

## 2018-08-07 LAB
BASOPHILS # BLD AUTO: 0.1 10E9/L (ref 0–0.2)
BASOPHILS NFR BLD AUTO: 0.7 %
DEPRECATED S PYO AG THROAT QL EIA: NORMAL
DIFFERENTIAL METHOD BLD: ABNORMAL
EOSINOPHIL # BLD AUTO: 0.9 10E9/L (ref 0–0.7)
EOSINOPHIL NFR BLD AUTO: 6.7 %
ERYTHROCYTE [DISTWIDTH] IN BLOOD BY AUTOMATED COUNT: 21.4 % (ref 10–15)
FERRITIN SERPL-MCNC: 9 NG/ML (ref 12–150)
HCT VFR BLD AUTO: 34.9 % (ref 35–47)
HGB BLD-MCNC: 10.2 G/DL (ref 11.7–15.7)
IMM GRANULOCYTES # BLD: 0.1 10E9/L (ref 0–0.4)
IMM GRANULOCYTES NFR BLD: 0.5 %
IRON SATN MFR SERPL: 5 % (ref 15–46)
IRON SERPL-MCNC: 19 UG/DL (ref 35–180)
LYMPHOCYTES # BLD AUTO: 3.4 10E9/L (ref 0.8–5.3)
LYMPHOCYTES NFR BLD AUTO: 25.1 %
MCH RBC QN AUTO: 21.5 PG (ref 26.5–33)
MCHC RBC AUTO-ENTMCNC: 29.2 G/DL (ref 31.5–36.5)
MCV RBC AUTO: 74 FL (ref 78–100)
MONOCYTES # BLD AUTO: 1 10E9/L (ref 0–1.3)
MONOCYTES NFR BLD AUTO: 7.2 %
NEUTROPHILS # BLD AUTO: 8 10E9/L (ref 1.6–8.3)
NEUTROPHILS NFR BLD AUTO: 59.8 %
PLATELET # BLD AUTO: 316 10E9/L (ref 150–450)
RBC # BLD AUTO: 4.75 10E12/L (ref 3.8–5.2)
SPECIMEN SOURCE: NORMAL
TIBC SERPL-MCNC: 355 UG/DL (ref 240–430)
WBC # BLD AUTO: 13.4 10E9/L (ref 4–11)

## 2018-08-07 PROCEDURE — 36415 COLL VENOUS BLD VENIPUNCTURE: CPT | Performed by: FAMILY MEDICINE

## 2018-08-07 PROCEDURE — 87880 STREP A ASSAY W/OPTIC: CPT | Performed by: PHYSICIAN ASSISTANT

## 2018-08-07 PROCEDURE — 99213 OFFICE O/P EST LOW 20 MIN: CPT | Performed by: PHYSICIAN ASSISTANT

## 2018-08-07 PROCEDURE — 83540 ASSAY OF IRON: CPT | Performed by: FAMILY MEDICINE

## 2018-08-07 PROCEDURE — 85025 COMPLETE CBC W/AUTO DIFF WBC: CPT | Performed by: FAMILY MEDICINE

## 2018-08-07 PROCEDURE — 82728 ASSAY OF FERRITIN: CPT | Performed by: FAMILY MEDICINE

## 2018-08-07 PROCEDURE — 87081 CULTURE SCREEN ONLY: CPT | Performed by: PHYSICIAN ASSISTANT

## 2018-08-07 PROCEDURE — 83550 IRON BINDING TEST: CPT | Performed by: FAMILY MEDICINE

## 2018-08-07 RX ORDER — GUAIFENESIN AND DEXTROMETHORPHAN HYDROBROMIDE 1200; 60 MG/1; MG/1
1 TABLET, EXTENDED RELEASE ORAL 2 TIMES DAILY
Qty: 28 TABLET | Refills: 0 | Status: SHIPPED | OUTPATIENT
Start: 2018-08-07 | End: 2018-08-30

## 2018-08-07 ASSESSMENT — PAIN SCALES - GENERAL: PAINLEVEL: NO PAIN (0)

## 2018-08-07 NOTE — MR AVS SNAPSHOT
After Visit Summary   8/7/2018    Evan Dominguez    MRN: [id number]           Patient Information     Date Of Birth          1980        Visit Information        Provider Department      8/7/2018 11:40 AM Blanche Rubio PA-C [facility]        Today's Diagnoses     Throat pain    -  1    Acute sinusitis with symptoms > 10 days          Care Instructions    Augmentin 875 mg, 1 tablet twice a day for 10 days  Increase fluids  Nasal wash  Mucinex DM 1 tablet twice a day for 10-14 dys  Follow up if not better after the antibiotic course.             Follow-ups after your visit        Who to contact     If you have questions or need follow up information about today's clinic visit or your schedule please contact [facility] directly at [phone].  Normal or non-critical lab and imaging results will be communicated to you by [employer], letter or phone within 4 business days after the clinic has received the results. If you do not hear from us within 7 days, please contact the clinic through [employer] or phone. If you have a critical or abnormal lab result, we will notify you by phone as soon as possible.  Submit refill requests through [employer] or call your pharmacy and they will forward the refill request to us. Please allow 3 business days for your refill to be completed.          Additional Information About Your Visit        MyChart Information     [employer] gives you secure access to your electronic health record. If you see a primary care provider, you can also send messages to your care team and make appointments. If you have questions, please call your primary care clinic.  If you do not have a primary care provider, please call [phone] and they will assist you.        Care EveryWhere ID     This is your Care EveryWhere ID. This could be used by other organizations to access your [place] medical records  [id number]        Your Vitals  "Were     Pulse Temperature Respirations Height Last Period Pulse Oximetry    92 98.4  F (36.9  C) (Oral) 20 1.657 m (5' 5.25\") 08/02/2018 97%    BMI (Body Mass Index)                   40.47 kg/m2            Blood Pressure from Last 3 Encounters:   08/07/18 125/82   06/06/18 116/75   09/12/17 118/74    Weight from Last 3 Encounters:   08/07/18 111.2 kg (245 lb 1.6 oz)   06/06/18 110.7 kg (244 lb 1.6 oz)   06/28/17 109.3 kg (240 lb 14.4 oz)              We Performed the Following     Strep, Rapid Screen          Today's Medication Changes          These changes are accurate as of 8/7/18 12:23 PM.  If you have any questions, ask your nurse or doctor.               Start taking these medicines.        Dose/Directions    amoxicillin-clavulanate 875-125 MG per tablet   Commonly known as:  AUGMENTIN   Used for:  Acute sinusitis with symptoms > 10 days   Started by:  Blanche Rubio PA-C        Dose:  1 tablet   Take 1 tablet by mouth 2 times daily for 10 days   Quantity:  20 tablet   Refills:  0       Dextromethorphan-Guaifenesin  MG Tb12   Used for:  Acute sinusitis with symptoms > 10 days   Started by:  Blanche Rubio PA-C        Dose:  1 tablet   Take 1 tablet by mouth 2 times daily   Quantity:  28 tablet   Refills:  0            Where to get your medicines      These medications were sent to [place] [address] [place] [address] [address]  [facility] [address]     Phone:  [phone]     amoxicillin-clavulanate 875-125 MG per tablet    Dextromethorphan-Guaifenesin  MG Tb12                Primary Care Provider Office Phone # Fax #    Pablo Mcdowell [phone] [address]  [facility] [address]        Equal Access to Services     [facility]: Hadii edward Cisneros, shabnam singh, qaybta kaalgregory fagan. [facility] [phone].    ATENCIÓN: Si akin zepeda, " tiene a rogers disposición servicios gratuitos de asistencia lingüística. [place] [phone].    We comply with applicable federal civil rights laws and [place] laws. We do not discriminate on the basis of race, color, national origin, age, disability, sex, sexual orientation, or gender identity.            Thank you!     Thank you for choosing [facility]  for your care. Our goal is always to provide you with excellent care. Hearing back from our patients is one way we can continue to improve our services. Please take a few minutes to complete the written survey that you may receive in the mail after your visit with us. Thank you!             Your Updated Medication List - Protect others around you: Learn how to safely use, store and throw away your medicines at www.disposemymeds.org.          This list is accurate as of 8/7/18 12:23 PM.  Always use your most recent med list.                   Brand Name Dispense Instructions for use Diagnosis    albuterol 108 (90 Base) MCG/ACT Inhaler    PROAIR HFA/PROVENTIL HFA/VENTOLIN HFA    1 Inhaler    Inhale 2 puffs into the lungs every 6 hours as needed for shortness of breath / dyspnea.    Intermittent asthma       amoxicillin-clavulanate 875-125 MG per tablet    AUGMENTIN    20 tablet    Take 1 tablet by mouth 2 times daily for 10 days    Acute sinusitis with symptoms > 10 days       ciclopirox 0.77 % cream    LOPROX    90 g    Apply topically 2 times daily To feet and affected toenail.    Tinea pedis of both feet, Dermatophytosis of nail       Dextromethorphan-Guaifenesin  MG Tb12     28 tablet    Take 1 tablet by mouth 2 times daily    Acute sinusitis with symptoms > 10 days       ferrous gluconate 324 (38 Fe) MG tablet    FERGON    100 tablet    Take 1 tablet (324 mg) by mouth daily (with breakfast)    Iron deficiency anemia, unspecified iron deficiency anemia type       lamoTRIgine 100 MG tablet    LaMICtal     Take 100 mg by mouth every  morning        olopatadine HCl 0.2 % Soln    PATADAY    1 Bottle    Place 1 drop into both eyes daily as needed (For itchy, water eyes.)    Allergic conjunctivitis, bilateral       paragard intrauterine copper     1 each    One device, intrauterine, for up to 10 years.    Encounter for IUD insertion       venlafaxine 150 MG 24 hr capsule    EFFEXOR-XR     Take 150 mg by mouth daily        VITAMIN C PO

## 2018-08-07 NOTE — PATIENT INSTRUCTIONS
Augmentin 875 mg, 1 tablet twice a day for 10 days  Increase fluids  Nasal wash  Mucinex DM 1 tablet twice a day for 10-14 dys  Follow up if not better after the antibiotic course.

## 2018-08-07 NOTE — PROGRESS NOTES
Hi Irion,  Your lab results showed improved hemoglobin at 10.2 g, please continue with your iron supplements.  Your white cell count is slightly elevated.  If you are going through either viral or bacterial infection that could be one of the possible reason.  Please have a recheck on this in 4 weeks(when you do not have any infections concurrently).   Let me know if you have any questions. Take care.  Pablo Mcdowell MD

## 2018-08-07 NOTE — PROGRESS NOTES
SUBJECTIVE:   Evan Dominguez is a 38 year old female who presents to clinic today for the following health issues:    Acute Illness   Acute illness concerns: cough   Onset: 2 weeks Wednesday     Fever: no    Chills/Sweats: YES    Headache (location?): YES    Sinus Pressure:no    Conjunctivitis:  no    Ear Pain: no    Rhinorrhea: YES    Congestion: YES    Sore Throat: YES     Cough: YES-productive of green sputum    Wheeze: no    Decreased Appetite: YES    Nausea: no    Vomiting: no    Diarrhea:  no    Dysuria/Freq.: no    Fatigue/Achiness: YES    Sick/Strep Exposure: YES- son had a cold      Therapies Tried and outcome: inhaler; little relief       Problem list and histories reviewed & adjusted, as indicated.  Additional history: as documented    Patient Active Problem List   Diagnosis     CARDIOVASCULAR SCREENING; LDL GOAL LESS THAN 160     Asthma, exercise induced     H/O kidney donation     Radial styloid tenosynovitis     Moderate major depression (H)     Meibomian gland dysfunction     Allergic conjunctivitis     Family history of diabetes mellitus     Previous  delivery, antepartum condition or complication     Asymptomatic bacteriuria in pregnancy in second trimester     Need for Tdap vaccination     Beta hemolytic streptococcus urinary tract infection affecting pregnancy     Excessive or frequent menstruation     Flat feet, bilateral     Ingrown nail     Nail dystrophy     Bilateral carpal tunnel syndrome     Bilateral hand numbness     Morbid obesity with BMI of 40.0-44.9, adult (H)     S/p nephrectomy, kidney donor     Adverse effect of drug, sequela, h/o dystonia     Pain in both wrists     Elbow pain, left     Past Surgical History:   Procedure Laterality Date     C NEPHRECTOMY      LT/Donated Kidney to mother     C NONSPECIFIC PROCEDURE Right     Ganglion Cyst/RT Wrist     C NONSPECIFIC PROCEDURE      laparoscopic gastric banding     C/SECTION, LOW TRANSVERSE  2013     , Low Transverse     LAPAROSCOPIC CHOLECYSTECTOMY  2014    Procedure: LAPAROSCOPIC CHOLECYSTECTOMY;  Surgeon: Cyril Hill MD;  Location:  OR       Social History   Substance Use Topics     Smoking status: Never Smoker     Smokeless tobacco: Never Used     Alcohol use Yes      Comment:  1 drink a month before pregancy     Family History   Problem Relation Age of Onset     Diabetes Mother      Pre Diabetic     Thyroid Disease Mother      Hypertension Father      Diabetes Maternal Grandmother      HEART DISEASE Maternal Grandfather      Diabetes Paternal Grandmother      HEART DISEASE Paternal Grandfather      Cancer No family hx of      Cerebrovascular Disease No family hx of      Glaucoma No family hx of      Macular Degeneration No family hx of      Skin Cancer No family hx of      Melanoma No family hx of          Current Outpatient Prescriptions   Medication Sig Dispense Refill     albuterol (PROAIR HFA, PROVENTIL HFA, VENTOLIN HFA) 108 (90 BASE) MCG/ACT inhaler Inhale 2 puffs into the lungs every 6 hours as needed for shortness of breath / dyspnea. 1 Inhaler 1     amoxicillin-clavulanate (AUGMENTIN) 875-125 MG per tablet Take 1 tablet by mouth 2 times daily for 10 days 20 tablet 0     Ascorbic Acid (VITAMIN C PO)        ciclopirox (LOPROX) 0.77 % cream Apply topically 2 times daily To feet and affected toenail. 90 g 6     Dextromethorphan-Guaifenesin  MG TB12 Take 1 tablet by mouth 2 times daily 28 tablet 0     ferrous gluconate (FERGON) 324 (38 Fe) MG tablet Take 1 tablet (324 mg) by mouth daily (with breakfast) 100 tablet 1     lamoTRIgine (LAMICTAL) 100 MG tablet Take 100 mg by mouth every morning  1     olopatadine HCl (PATADAY) 0.2 % SOLN Place 1 drop into both eyes daily as needed (For itchy, water eyes.) 1 Bottle 6     paragard intrauterine copper One device, intrauterine, for up to 10 years. 1 each      venlafaxine (EFFEXOR-XR) 150 MG 24 hr capsule Take 150 mg by mouth  "daily  0     Allergies   Allergen Reactions     Contrast Dye      Congestion and sneezing with IVP dye     Thimerosol [Thimerosal]      Swelling and redness with eye drops       Reviewed and updated as needed this visit by clinical staff  Tobacco  Allergies  Meds  Problems  Med Hx  Surg Hx  Fam Hx  Soc Hx        Reviewed and updated as needed this visit by Provider  Allergies  Meds  Problems         ROS:  Constitutional, HEENT, cardiovascular, pulmonary, GI, , musculoskeletal, neuro, skin, endocrine and psych systems are negative, except as otherwise noted.    OBJECTIVE:     /82 (BP Location: Right arm, Patient Position: Sitting, Cuff Size: Adult Large)  Pulse 92  Temp 98.4  F (36.9  C) (Oral)  Resp 20  Ht 1.657 m (5' 5.25\")  Wt 111.2 kg (245 lb 1.6 oz)  LMP 08/02/2018  SpO2 97%  BMI 40.47 kg/m2  Body mass index is 40.47 kg/(m^2).  GENERAL: healthy, alert and no distress  EYES: Eyes grossly normal to inspection, PERRL and conjunctivae and sclerae normal  HENT: normal cephalic/atraumatic, ear canals and TM's normal, nose and mouth without ulcers or lesions, nasal mucosa edematous , oropharynx clear, oral mucous membranes moist and sinuses: not tender  NECK: no adenopathy, no asymmetry, masses, or scars and thyroid normal to palpation  RESP: lungs clear to auscultation - no rales, rhonchi or wheezes  CV: regular rate and rhythm, normal S1 S2, no S3 or S4, no murmur, click or rub, no peripheral edema and peripheral pulses strong  ABDOMEN: soft, nontender, no hepatosplenomegaly, no masses and bowel sounds normal  MS: no gross musculoskeletal defects noted, no edema    Diagnostic Test Results:  Results for orders placed or performed in visit on 08/07/18 (from the past 24 hour(s))   Strep, Rapid Screen   Result Value Ref Range    Specimen Description Throat     Rapid Strep A Screen       NEGATIVE: No Group A streptococcal antigen detected by immunoassay, await culture report. "         ASSESSMENT/PLAN:       ICD-10-CM    1. Throat pain R07.0 Strep, Rapid Screen     Beta strep group A culture   2. Acute sinusitis with symptoms > 10 days J01.90 amoxicillin-clavulanate (AUGMENTIN) 875-125 MG per tablet     Dextromethorphan-Guaifenesin  MG TB12     Augmentin 875 mg, 1 tablet twice a day for 10 days  Increase fluids  Nasal wash  Mucinex DM 1 tablet twice a day for 10-14 dys  Follow up if not better after the antibiotic course.         Blanche Rubio PA-C  [facility]

## 2018-08-08 LAB
BACTERIA SPEC CULT: NORMAL
SPECIMEN SOURCE: NORMAL

## 2018-08-09 LAB — COPATH REPORT: NORMAL

## 2018-08-30 ENCOUNTER — OFFICE VISIT (OUTPATIENT)
Dept: OBGYN | Facility: CLINIC | Age: 38
End: 2018-08-30
Payer: COMMERCIAL

## 2018-08-30 VITALS
DIASTOLIC BLOOD PRESSURE: 74 MMHG | OXYGEN SATURATION: 98 % | SYSTOLIC BLOOD PRESSURE: 111 MMHG | WEIGHT: 244.5 LBS | BODY MASS INDEX: 40.38 KG/M2 | HEART RATE: 84 BPM

## 2018-08-30 DIAGNOSIS — R53.83 OTHER FATIGUE: ICD-10-CM

## 2018-08-30 DIAGNOSIS — D50.0 IRON DEFICIENCY ANEMIA DUE TO CHRONIC BLOOD LOSS: ICD-10-CM

## 2018-08-30 DIAGNOSIS — N92.0 EXCESSIVE OR FREQUENT MENSTRUATION: ICD-10-CM

## 2018-08-30 DIAGNOSIS — Z30.433 ENCOUNTER FOR IUD REMOVAL AND REINSERTION: Primary | ICD-10-CM

## 2018-08-30 PROCEDURE — 99213 OFFICE O/P EST LOW 20 MIN: CPT | Mod: 25 | Performed by: OBSTETRICS & GYNECOLOGY

## 2018-08-30 PROCEDURE — 58300 INSERT INTRAUTERINE DEVICE: CPT | Performed by: OBSTETRICS & GYNECOLOGY

## 2018-08-30 PROCEDURE — 58301 REMOVE INTRAUTERINE DEVICE: CPT | Performed by: OBSTETRICS & GYNECOLOGY

## 2018-08-30 NOTE — PROGRESS NOTES
Evan Dominguez is a 38 year old female, , who presents to discuss the contraception options.  She has been using the ParaGard IUD since her postpartum visit.  She has had regular menses but she has had increase bleeding with clots and she has become Fe deficient.  Even with the supplemental Fe, she has continued to have some lightheadedness and fatigue.  We reviewed the contraception options that might be beneficial for the bleeding, with the associated risks and benefits and goals and limitations.     Past Medical History:   Diagnosis Date     Abnormal Pap smear     resolved     Asthma     Mild;  extreme cold or heavy exercises     Asymptomatic bacteriuria in pregnancy in second trimester 2015    Beta strep isolated.      Depression      Iron deficiency anemia      Migraine headache      Postpartum hypertension     no magnesium sulfate used.       Solitary kidney     S/P Donated, Left       Past Surgical History:   Procedure Laterality Date     C NEPHRECTOMY      LT/Donated Kidney to mother     C NONSPECIFIC PROCEDURE Right     Ganglion Cyst/RT Wrist     C NONSPECIFIC PROCEDURE      laparoscopic gastric banding     C/SECTION, LOW TRANSVERSE  2013    , Low Transverse     LAPAROSCOPIC CHOLECYSTECTOMY  2014    Procedure: LAPAROSCOPIC CHOLECYSTECTOMY;  Surgeon: Cyril Hill MD;  Location: [facility] OR          Allergies   Allergen Reactions     Contrast Dye      Congestion and sneezing with IVP dye     Thimerosol [Thimerosal]      Swelling and redness with eye drops       Current Outpatient Prescriptions   Medication Sig Dispense Refill     albuterol (PROAIR HFA, PROVENTIL HFA, VENTOLIN HFA) 108 (90 BASE) MCG/ACT inhaler Inhale 2 puffs into the lungs every 6 hours as needed for shortness of breath / dyspnea. 1 Inhaler 1     Ascorbic Acid (VITAMIN C PO)        ciclopirox (LOPROX) 0.77 % cream Apply topically 2 times daily To feet and affected toenail. 90 g 6      ferrous gluconate (FERGON) 324 (38 Fe) MG tablet Take 1 tablet (324 mg) by mouth daily (with breakfast) 100 tablet 1     lamoTRIgine (LAMICTAL) 100 MG tablet Take 100 mg by mouth every morning  1     olopatadine HCl (PATADAY) 0.2 % SOLN Place 1 drop into both eyes daily as needed (For itchy, water eyes.) 1 Bottle 6     paragard intrauterine copper One device, intrauterine, for up to 10 years. 1 each      venlafaxine (EFFEXOR-XR) 150 MG 24 hr capsule Take 150 mg by mouth daily  0       Social History     Social History     Marital status:      Spouse name: Jeromy Mchugh     Number of children: 01     Years of education: 16     Occupational History     RN      [employer]     Social History Main Topics     Smoking status: Never Smoker     Smokeless tobacco: Never Used     Alcohol use Yes      Comment:  1 drink a month before pregancy     Drug use: No     Sexual activity: Yes     Partners: Male     Other Topics Concern      Service No     Blood Transfusions No     Caffeine Concern No     Occupational Exposure No     Hobby Hazards No     Sleep Concern No     Stress Concern No     Weight Concern Yes     Special Diet No     Back Care No     Exercise Yes     Bike Helmet Yes     Seat Belt Yes     Self-Exams Yes     Social History Narrative       Family History   Problem Relation Age of Onset     Diabetes Mother      Pre Diabetic     Thyroid Disease Mother      Hypertension Father      Diabetes Maternal Grandmother      HEART DISEASE Maternal Grandfather      Diabetes Paternal Grandmother      HEART DISEASE Paternal Grandfather      Cancer No family hx of      Cerebrovascular Disease No family hx of      Glaucoma No family hx of      Macular Degeneration No family hx of      Skin Cancer No family hx of      Melanoma No family hx of        Review of Systems:  10 point ROS of systems including Constitutional, Eyes, Respiratory, Cardiovascular, Gastroenterology, Genitourinary, Integumentary,  Muscularskeletal, Psychiatric were all negative except for pertinent positives noted in my HPI and in the PMH.      Exam  /74 (BP Location: Right arm, Cuff Size: Adult Large)  Pulse 84  Wt 244 lb 8 oz (110.9 kg)  LMP 08/28/2018  SpO2 98%  BMI 40.38 kg/m2  General:  WNWD female, NAD  Alert  Oriented x 3  Gait:  Normal  Skin:  Normal skin turgor  HEENT:  NC/AT, EOMI  Abdomen:  Non-tender, non-distended.  Vulva: No external lesions, normal hair distribution, no adenopathy  BUS:  Normal, no masses noted  Urethra:  No hypermobility seen  Urethral meatus:  No masses noted  Vagina: Moist, pink, no abnormal discharge, well rugated, no lesions  Cervix: Smooth, pink, no visible lesions  Uterus: Normal size, anteverted, non-tender, mobile  Ovaries: No mass, non-tender, mobile  Perianal:  No masses noted  Extremities:  No clubbing, no cyanosis and no edema.      Assessment  Encounter for IUD removal and insertion   Excessive menses  Iron deficiency anemia  Fatigue      Plan  We discussed the options and she decided she would like to have the Mirena IUD.   The bleeding should improve, but she might have irregular bleeding, but hopefully it will be lighter.  She has about 20% chance of not having any bleeding.  She voices understanding.      Pillo Rios MD      PROCEDURE NOTE:  I discussed the method, effectiveness, contraindications, risks, benefits, alternatives and the insertion and removal procedures.  Patient was an appropriate candidate and desired to proceed.  Consent signed.   The patient desires to have the IUD removed.  She reports that she has used it for the past 3 years.  I reviewed with her about the other benefits for the procedure and the goals and limitations and she voiced her understanding.  The procedure was discussed and she voiced her understanding.  The patient was examined, she was prepped for the IUD removal.  The ring forceps were used to remove the IUD.  The ParaGard IUD was shown to the  patient to verify the IUD.    The cervix was grasped with a tenaculum and the uterine cavity sounded to 9 cm.  The Mirena IUD was inserted using the standard and sterile technique.  The strings were trimmed to approximately 2.5 cm.  No apparent complications.  Patient tolerated the procedure well.  The IUD effectiveness is 5 years.    Follow up in 1 month for IUD check and yearly for annual exams.  She should alert us to any GYN problems.     LOT NUMBER: [id number]  EXP DATE:  March 2021     Pillo Rios MD

## 2018-08-30 NOTE — MR AVS SNAPSHOT
After Visit Summary   8/30/2018    Evan Dominguez    MRN: [id number]           Patient Information     Date Of Birth          1980        Visit Information        Provider Department      8/30/2018 8:00 AM Pillo Rios MD [facility] [place] [facility]        Today's Diagnoses     Encounter for IUD removal and reinsertion    -  1    Excessive or frequent menstruation        Iron deficiency anemia due to chronic blood loss        Other fatigue           Follow-ups after your visit        Follow-up notes from your care team     Return in about 4 weeks (around 9/27/2018).      Who to contact     If you have questions or need follow up information about today's clinic visit or your schedule please contact [facility] directly at [phone].  Normal or non-critical lab and imaging results will be communicated to you by [employer], letter or phone within 4 business days after the clinic has received the results. If you do not hear from us within 7 days, please contact the clinic through [employer] or phone. If you have a critical or abnormal lab result, we will notify you by phone as soon as possible.  Submit refill requests through [employer] or call your pharmacy and they will forward the refill request to us. Please allow 3 business days for your refill to be completed.          Additional Information About Your Visit        MyChart Information     [employer] gives you secure access to your electronic health record. If you see a primary care provider, you can also send messages to your care team and make appointments. If you have questions, please call your primary care clinic.  If you do not have a primary care provider, please call [phone] and they will assist you.        Care EveryWhere ID     This is your Care EveryWhere ID. This could be used by other organizations to access your [facility] medical records  [id number]        Your Vitals Were     Pulse Last Period Pulse  Oximetry BMI (Body Mass Index)          84 08/28/2018 98% 40.38 kg/m2         Blood Pressure from Last 3 Encounters:   08/30/18 111/74   08/07/18 125/82   06/06/18 116/75    Weight from Last 3 Encounters:   08/30/18 244 lb 8 oz (110.9 kg)   08/07/18 245 lb 1.6 oz (111.2 kg)   06/06/18 244 lb 1.6 oz (110.7 kg)              We Performed the Following     INSERTION INTRAUTERINE DEVICE     REMOVE INTRAUTERINE DEVICE          Today's Medication Changes          These changes are accurate as of 8/30/18  9:51 AM.  If you have any questions, ask your nurse or doctor.               Start taking these medicines.        Dose/Directions    levonorgestrel 20 MCG/24HR IUD   Commonly known as:  MIRENA (52 MG)   Used for:  Encounter for IUD removal and reinsertion, Excessive or frequent menstruation, Iron deficiency anemia due to chronic blood loss, Other fatigue   Started by:  Pillo Rios MD        One device, intrauterine, for up to 5 years   Quantity:  1 each   Refills:  0            Where to get your medicines      Some of these will need a paper prescription and others can be bought over the counter.  Ask your nurse if you have questions.     You don't need a prescription for these medications     levonorgestrel 20 MCG/24HR IUD                Primary Care Provider Office Phone # Fax #    Pablo Mcdowell [phone]       [address]  [facility] [address]        Equal Access to Services     [facility] AH: Hadii edward Cisneros, waaxda lubharathadaha, qaybta kaalmada jennifer, waxay armin hammond adechris pimentel. So [facility] [phone].    ATENCIÓN: Si habla español, tiene a rogers disposición servicios gratuitos de asistencia lingüística. Llame al [phone].    We comply with applicable federal civil rights laws and [place] laws. We do not discriminate on the basis of race, color, national origin, age, disability, sex, sexual orientation, or gender identity.            Thank you!      Thank you for choosing [facility]  for your care. Our goal is always to provide you with excellent care. Hearing back from our patients is one way we can continue to improve our services. Please take a few minutes to complete the written survey that you may receive in the mail after your visit with us. Thank you!             Your Updated Medication List - Protect others around you: Learn how to safely use, store and throw away your medicines at www.disposemymeds.org.          This list is accurate as of 8/30/18  9:51 AM.  Always use your most recent med list.                   Brand Name Dispense Instructions for use Diagnosis    albuterol 108 (90 Base) MCG/ACT inhaler    PROAIR HFA/PROVENTIL HFA/VENTOLIN HFA    1 Inhaler    Inhale 2 puffs into the lungs every 6 hours as needed for shortness of breath / dyspnea.    Intermittent asthma       ciclopirox 0.77 % cream    LOPROX    90 g    Apply topically 2 times daily To feet and affected toenail.    Tinea pedis of both feet, Dermatophytosis of nail       ferrous gluconate 324 (38 Fe) MG tablet    FERGON    100 tablet    Take 1 tablet (324 mg) by mouth daily (with breakfast)    Iron deficiency anemia, unspecified iron deficiency anemia type       lamoTRIgine 100 MG tablet    LaMICtal     Take 100 mg by mouth every morning        levonorgestrel 20 MCG/24HR IUD    MIRENA (52 MG)    1 each    One device, intrauterine, for up to 5 years    Encounter for IUD removal and reinsertion, Excessive or frequent menstruation, Iron deficiency anemia due to chronic blood loss, Other fatigue       olopatadine HCl 0.2 % Soln    PATADAY    1 Bottle    Place 1 drop into both eyes daily as needed (For itchy, water eyes.)    Allergic conjunctivitis, bilateral       paragard intrauterine copper     1 each    One device, intrauterine, for up to 10 years.    Encounter for IUD insertion       venlafaxine 150 MG 24 hr capsule    EFFEXOR-XR     Take 150 mg by mouth daily         VITAMIN C PO

## 2018-12-11 DIAGNOSIS — D50.9 IRON DEFICIENCY ANEMIA, UNSPECIFIED IRON DEFICIENCY ANEMIA TYPE: ICD-10-CM

## 2018-12-11 LAB
ERYTHROCYTE [DISTWIDTH] IN BLOOD BY AUTOMATED COUNT: 16 % (ref 10–15)
FERRITIN SERPL-MCNC: 7 NG/ML (ref 12–150)
HCT VFR BLD AUTO: 38.9 % (ref 35–47)
HGB BLD-MCNC: 12.5 G/DL (ref 11.7–15.7)
IRON SATN MFR SERPL: 12 % (ref 15–46)
IRON SERPL-MCNC: 44 UG/DL (ref 35–180)
MCH RBC QN AUTO: 25.1 PG (ref 26.5–33)
MCHC RBC AUTO-ENTMCNC: 32.1 G/DL (ref 31.5–36.5)
MCV RBC AUTO: 78 FL (ref 78–100)
PLATELET # BLD AUTO: 333 10E9/L (ref 150–450)
RBC # BLD AUTO: 4.98 10E12/L (ref 3.8–5.2)
TIBC SERPL-MCNC: 382 UG/DL (ref 240–430)
WBC # BLD AUTO: 11.3 10E9/L (ref 4–11)

## 2018-12-11 PROCEDURE — 85027 COMPLETE CBC AUTOMATED: CPT | Performed by: FAMILY MEDICINE

## 2018-12-11 PROCEDURE — 83540 ASSAY OF IRON: CPT | Performed by: FAMILY MEDICINE

## 2018-12-11 PROCEDURE — 82728 ASSAY OF FERRITIN: CPT | Performed by: FAMILY MEDICINE

## 2018-12-11 PROCEDURE — 36415 COLL VENOUS BLD VENIPUNCTURE: CPT | Performed by: FAMILY MEDICINE

## 2018-12-11 PROCEDURE — 83550 IRON BINDING TEST: CPT | Performed by: FAMILY MEDICINE

## 2018-12-11 NOTE — RESULT ENCOUNTER NOTE
Dear Evan,  Your lab test indicated improved and normal hemoglobin and improved iron levels.  Please continue your current dose of iron supplements as before.   Let me know if you have any questions. Take care.  Pablo Mcdowell MD

## 2019-01-14 PROBLEM — R20.0 BILATERAL HAND NUMBNESS: Status: RESOLVED | Noted: 2018-06-06 | Resolved: 2019-01-14

## 2019-01-14 PROBLEM — G56.03 BILATERAL CARPAL TUNNEL SYNDROME: Status: RESOLVED | Noted: 2018-06-06 | Resolved: 2019-01-14

## 2019-01-14 PROBLEM — M25.522 ELBOW PAIN, LEFT: Status: RESOLVED | Noted: 2018-06-11 | Resolved: 2019-01-14

## 2019-01-14 PROBLEM — M25.531 PAIN IN BOTH WRISTS: Status: RESOLVED | Noted: 2018-06-11 | Resolved: 2019-01-14

## 2019-01-14 PROBLEM — M25.532 PAIN IN BOTH WRISTS: Status: RESOLVED | Noted: 2018-06-11 | Resolved: 2019-01-14

## 2019-01-14 NOTE — PROGRESS NOTES
Discharge Summary - Hand Therapy    Patient did not return to therapy.  Please refer to the last SOAP note for objective details and goal achievement.  We will assume that patient's goals were met.    D/C from [facility].

## 2019-02-28 ENCOUNTER — TRANSFERRED RECORDS (OUTPATIENT)
Dept: HEALTH INFORMATION MANAGEMENT | Facility: CLINIC | Age: 39
End: 2019-02-28

## 2019-04-12 ENCOUNTER — TELEPHONE (OUTPATIENT)
Dept: PEDIATRICS | Facility: CLINIC | Age: 39
End: 2019-04-12

## 2019-04-12 ENCOUNTER — OFFICE VISIT (OUTPATIENT)
Dept: PEDIATRICS | Facility: CLINIC | Age: 39
End: 2019-04-12
Payer: COMMERCIAL

## 2019-04-12 VITALS
BODY MASS INDEX: 41.76 KG/M2 | TEMPERATURE: 97.7 F | HEART RATE: 103 BPM | DIASTOLIC BLOOD PRESSURE: 83 MMHG | SYSTOLIC BLOOD PRESSURE: 128 MMHG | WEIGHT: 252.9 LBS | OXYGEN SATURATION: 96 % | RESPIRATION RATE: 20 BRPM

## 2019-04-12 DIAGNOSIS — R11.10 VOMITING AND DIARRHEA: ICD-10-CM

## 2019-04-12 DIAGNOSIS — R19.7 VOMITING AND DIARRHEA: ICD-10-CM

## 2019-04-12 DIAGNOSIS — A08.4 VIRAL GASTROENTERITIS: Primary | ICD-10-CM

## 2019-04-12 DIAGNOSIS — R79.89 ELEVATED LFTS: Primary | ICD-10-CM

## 2019-04-12 DIAGNOSIS — R74.8 ELEVATED LIVER ENZYMES: Primary | ICD-10-CM

## 2019-04-12 PROBLEM — N92.0 EXCESSIVE OR FREQUENT MENSTRUATION: Status: RESOLVED | Noted: 2017-06-28 | Resolved: 2019-04-12

## 2019-04-12 PROBLEM — L60.0 INGROWN NAIL: Status: RESOLVED | Noted: 2017-06-28 | Resolved: 2019-04-12

## 2019-04-12 LAB
ALBUMIN SERPL-MCNC: 3.9 G/DL (ref 3.4–5)
ALP SERPL-CCNC: 98 U/L (ref 40–150)
ALT SERPL W P-5'-P-CCNC: 99 U/L (ref 0–50)
ANION GAP SERPL CALCULATED.3IONS-SCNC: 8 MMOL/L (ref 3–14)
AST SERPL W P-5'-P-CCNC: 67 U/L (ref 0–45)
BILIRUB SERPL-MCNC: 0.3 MG/DL (ref 0.2–1.3)
BUN SERPL-MCNC: 6 MG/DL (ref 7–30)
C DIFF TOX B STL QL: NEGATIVE
CALCIUM SERPL-MCNC: 8.8 MG/DL (ref 8.5–10.1)
CHLORIDE SERPL-SCNC: 110 MMOL/L (ref 94–109)
CO2 SERPL-SCNC: 22 MMOL/L (ref 20–32)
CREAT SERPL-MCNC: 0.73 MG/DL (ref 0.52–1.04)
ERYTHROCYTE [DISTWIDTH] IN BLOOD BY AUTOMATED COUNT: 14.5 % (ref 10–15)
GFR SERPL CREATININE-BSD FRML MDRD: >90 ML/MIN/{1.73_M2}
GLUCOSE SERPL-MCNC: 97 MG/DL (ref 70–99)
HCT VFR BLD AUTO: 41.5 % (ref 35–47)
HGB BLD-MCNC: 13.4 G/DL (ref 11.7–15.7)
MCH RBC QN AUTO: 25 PG (ref 26.5–33)
MCHC RBC AUTO-ENTMCNC: 32.3 G/DL (ref 31.5–36.5)
MCV RBC AUTO: 78 FL (ref 78–100)
PLATELET # BLD AUTO: 303 10E9/L (ref 150–450)
POTASSIUM SERPL-SCNC: 3.4 MMOL/L (ref 3.4–5.3)
PROT SERPL-MCNC: 8 G/DL (ref 6.8–8.8)
RBC # BLD AUTO: 5.35 10E12/L (ref 3.8–5.2)
SODIUM SERPL-SCNC: 140 MMOL/L (ref 133–144)
SPECIMEN SOURCE: NORMAL
WBC # BLD AUTO: 8.1 10E9/L (ref 4–11)

## 2019-04-12 PROCEDURE — 86706 HEP B SURFACE ANTIBODY: CPT | Performed by: FAMILY MEDICINE

## 2019-04-12 PROCEDURE — 80053 COMPREHEN METABOLIC PANEL: CPT | Performed by: FAMILY MEDICINE

## 2019-04-12 PROCEDURE — 87493 C DIFF AMPLIFIED PROBE: CPT | Performed by: FAMILY MEDICINE

## 2019-04-12 PROCEDURE — 85027 COMPLETE CBC AUTOMATED: CPT | Performed by: FAMILY MEDICINE

## 2019-04-12 PROCEDURE — 86803 HEPATITIS C AB TEST: CPT | Performed by: FAMILY MEDICINE

## 2019-04-12 PROCEDURE — 86704 HEP B CORE ANTIBODY TOTAL: CPT | Performed by: FAMILY MEDICINE

## 2019-04-12 PROCEDURE — 36415 COLL VENOUS BLD VENIPUNCTURE: CPT | Performed by: FAMILY MEDICINE

## 2019-04-12 PROCEDURE — 99213 OFFICE O/P EST LOW 20 MIN: CPT | Performed by: FAMILY MEDICINE

## 2019-04-12 PROCEDURE — 87340 HEPATITIS B SURFACE AG IA: CPT | Performed by: FAMILY MEDICINE

## 2019-04-12 RX ORDER — ONDANSETRON 4 MG/1
4 TABLET, ORALLY DISINTEGRATING ORAL EVERY 8 HOURS PRN
Qty: 20 TABLET | Refills: 0 | Status: SHIPPED | OUTPATIENT
Start: 2019-04-12 | End: 2019-07-22

## 2019-04-12 ASSESSMENT — PAIN SCALES - GENERAL: PAINLEVEL: MILD PAIN (2)

## 2019-04-12 NOTE — PROGRESS NOTES
SUBJECTIVE:   Evan Dominguez is a 39 year old female who presents to clinic today for the following   health issues:      Gastrointestinal symptoms      Duration: Five days ago    Description:           ABDOMINAL PAIN - left lower quadrant and pelvic region  .   Pain is described as sharp and nagging and radiates to not necessarty.    Intensity:  2/10 but sometimes it can get worse pt stated    Accompanying signs and symptoms:  nausea, diarrhea and bloating    History  Previous {similar problem: Nothing like this  Previous evaluation:  Pt states that her Gallbladder was removed in  and had it an ultrasound    Aggravating factors: When having meals and moving around sometimes    Alleviating factors: nothing except taking a probiotic pill    Other Therapies tried: Probiotic pills        Additional history: as documented    Reviewed  and updated as needed this visit by clinical staff         Reviewed and updated as needed this visit by Provider         Patient Active Problem List   Diagnosis     CARDIOVASCULAR SCREENING; LDL GOAL LESS THAN 160     Asthma, exercise induced     H/O kidney donation     Moderate major depression (H)     Meibomian gland dysfunction     Need for Tdap vaccination     Flat feet, bilateral     Nail dystrophy     Morbid obesity with BMI of 40.0-44.9, adult (H)     S/p nephrectomy, kidney donor     Adverse effect of drug, sequela, h/o dystonia     Iron deficiency anemia, unspecified iron deficiency anemia type     Past Surgical History:   Procedure Laterality Date     C NEPHRECTOMY      LT/Donated Kidney to mother     C NONSPECIFIC PROCEDURE Right     Ganglion Cyst/RT Wrist     C NONSPECIFIC PROCEDURE      laparoscopic gastric banding     C/SECTION, LOW TRANSVERSE  2013    , Low Transverse     LAPAROSCOPIC CHOLECYSTECTOMY  2014    Procedure: LAPAROSCOPIC CHOLECYSTECTOMY;  Surgeon: Cyril Hill MD;  Location:  OR       Social History     Tobacco  Use     Smoking status: Never Smoker     Smokeless tobacco: Never Used   Substance Use Topics     Alcohol use: Yes     Comment:  1 drink a month before pregancy     Family History   Problem Relation Age of Onset     Diabetes Mother         Pre Diabetic     Thyroid Disease Mother      Hypertension Father      Diabetes Maternal Grandmother      Heart Disease Maternal Grandfather      Diabetes Paternal Grandmother      Heart Disease Paternal Grandfather      Cancer No family hx of      Cerebrovascular Disease No family hx of      Glaucoma No family hx of      Macular Degeneration No family hx of      Skin Cancer No family hx of      Melanoma No family hx of          Current Outpatient Medications   Medication Sig Dispense Refill     albuterol (PROAIR HFA, PROVENTIL HFA, VENTOLIN HFA) 108 (90 BASE) MCG/ACT inhaler Inhale 2 puffs into the lungs every 6 hours as needed for shortness of breath / dyspnea. 1 Inhaler 1     Ascorbic Acid (VITAMIN C PO)        ciclopirox (LOPROX) 0.77 % cream Apply topically 2 times daily To feet and affected toenail. 90 g 6     lamoTRIgine (LAMICTAL) 100 MG tablet Take 100 mg by mouth every morning  1     levonorgestrel (MIRENA, 52 MG,) 20 MCG/24HR IUD One device, intrauterine, for up to 5 years 1 each 0     olopatadine HCl (PATADAY) 0.2 % SOLN Place 1 drop into both eyes daily as needed (For itchy, water eyes.) 1 Bottle 6     ondansetron (ZOFRAN-ODT) 4 MG ODT tab Take 1 tablet (4 mg) by mouth every 8 hours as needed for nausea or vomiting 20 tablet 0     venlafaxine (EFFEXOR-XR) 150 MG 24 hr capsule Take 150 mg by mouth daily  0     ferrous gluconate (FERGON) 324 (38 Fe) MG tablet Take 1 tablet (324 mg) by mouth daily (with breakfast) (Patient not taking: Reported on 4/12/2019) 100 tablet 1       ROS:  Constitutional, HEENT, cardiovascular, pulmonary, gi and gu systems are negative, except as otherwise noted.    OBJECTIVE:     /83 (BP Location: Right arm, Patient Position: Sitting, Cuff  Size: Adult Large)   Pulse 103   Temp 97.7  F (36.5  C) (Oral)   Resp 20   Wt 114.7 kg (252 lb 14.4 oz)   SpO2 96%   BMI 41.76 kg/m    Body mass index is 41.76 kg/m .   GENERAL: healthy, alert and no distress  EYES: Eyes grossly normal to inspection, PERRL and conjunctivae and sclerae normal  HENT: ear canals and TM's normal, nose and mouth without ulcers or lesions  NECK: no adenopathy, no asymmetry, masses, or scars and thyroid normal to palpation  RESP: lungs clear to auscultation - no rales, rhonchi or wheezes  CV: regular rate and rhythm, normal S1 S2, no S3 or S4, no murmur, click or rub, no peripheral edema and peripheral pulses strong  ABDOMEN: soft, nontender, no hepatosplenomegaly, no masses and bowel sounds normal      ASSESSMENT:   Evan was seen today for recheck.    Diagnoses and all orders for this visit:    Viral gastroenteritis  -     ondansetron (ZOFRAN-ODT) 4 MG ODT tab; Take 1 tablet (4 mg) by mouth every 8 hours as needed for nausea or vomiting    Vomiting and diarrhea  -     CBC with platelets  -     Comprehensive metabolic panel  -     Clostridium difficile Toxin B PCR; Future        PLAN:    I have recommended small amounts clear fluids frequently, soups, juices, water and advance diet as tolerated. Return office visit if symptoms persist or worsen; I have alerted the patient to call if high fever, dehydration, marked weakness, fainting, increased abdominal pain, blood in stool or vomit.    See Patient Instructions    Kareen Majano MD  [facility]

## 2019-04-12 NOTE — PATIENT INSTRUCTIONS
Patient Education     Noninfectious Gastroenteritis (Adult)    Gastroenteritis can cause nausea, vomiting, diarrhea, and cramping in the belly. This may occur from food sensitivity, inflammation of your gastrointestinal tract, medicines, stress, or other causes not related to infection. Your symptoms will usually last from 1 to 3 days, but can last longer. Antibiotics are not effective, but simple home treatment will be helpful.  Home care  Medicine    You may use acetaminophen or NSAID medicines like ibuprofen or naproxen to control fever, unless another medicine is prescribed. (Note: If you have chronic liver or kidney disease, or ever had a stomach ulcer or gastrointestinaI bleeding, talk with your healthcare provider before using these medicines.) Aspirin should never be used in anyone under 18 years of age who is ill with a fever. It may cause severe liver damage. Don't increase your NSAID medicines if you are already taking these medicines for another condition (like arthritis). Don't use NSAIDS if you are on aspirin (such as for heart disease, or after a stroke).    If medicines for diarrhea or vomiting are prescribed, take only as directed.  General care and preventing spread of the illness    If symptoms are severe, rest at home for the next 24 hours or until you feel better.    Hand washing with soap and water is the best way to prevent the spread of infection. Wash your hands after touching anyone who is sick.    Wash your hands after using the toilet and before meals. Clean the toilet after each use.    Caffeine, tobacco, and alcohol can make your diarrhea, cramping, and pain worse.  Diet    Water and clear liquids are important so you do not get dehydrated. Drink a small amount at a time.    Don't force yourself to eat, especially if you have cramps, vomiting, or diarrhea. When you finally decide to start eating, do not eat large amounts at a time, even if you are hungry.    If you eat, avoid fatty,  greasy, spicy, or fried foods.    Don't eat dairy products if you have diarrhea; they can make the diarrhea worse.  During the first 24 hours (the first full day), follow the diet below:    Beverages: Water, clear liquids, soft drinks without caffeine, like ginger ale; mineral water (plain or flavored); decaffeinated tea and coffee.    Soups: Clear broth, consommé, and bouillon sports drinks aren't a good choice because they have too much sugar and not enough electrolytes. In this case, commercially available products called oral rehydration solutions are best.    Desserts: Plain gelatin, ice pops, and fruit juice bars  During the next 24 hours (the second day), you may add the following to the above if you have improved. If not, continue what you did the first day:    Hot cereal, plain toast, bread, rolls, crackers    Plain noodles, rice, mashed potatoes, chicken noodle or rice soup    Unsweetened canned fruit and bananas (don't eat pineapple or citrus)    Limit caffeine and chocolate. No spices or seasonings except salt.  During the next 24 hours    Gradually resume a normal diet, as you feel better and your symptoms improve.    If at any time your symptoms start getting worse, go back to clear liquids until you feel better.  Food preparation    If you have diarrhea, you should not prepare food for others. When you  prepare food for yourself, wash your hands before and after.    Wash your hands after using cutting boards, countertops, and knives that have been in contact with raw food.    Keep uncooked meats away from cooked and ready-to-eat foods.  Follow-up care  Follow up with your healthcare provider if you are not improving over the next 2 to 3 days, or as advised. If a stool (diarrhea) sample was taken, call for the results as directed.  Call 911  Call 911 if any of these occur:    Trouble breathing    Chest pain    Confusion    Severe drowsiness or trouble awakening    Seizure    Stiff neck  When to seek  medical advice  Call your healthcare provider right away if any of these occur:     Increasing belly pain or constant lower right belly pain    Continued vomiting (unable to keep liquids down)    Frequent diarrhea (more than 5 times a day)    Blood in vomit or stool (black or red color)    Inability to tolerate solid food after a few days.    Dark urine, reduced urine output    Weakness, dizziness    Drowsiness    Fever of 100.4 F (38.0 C) or higher, or as directed by your healthcare provider    New rash  Date Last Reviewed: 11/16/2015 2000-2018 [employer]. [address] [place] [address]. All rights reserved. This information is not intended as a substitute for professional medical care. Always follow your healthcare professional's instructions.

## 2019-04-13 LAB
HBV CORE AB SERPL QL IA: NONREACTIVE
HBV SURFACE AB SERPL IA-ACNC: 9.7 M[IU]/ML
HBV SURFACE AG SERPL QL IA: NONREACTIVE
HCV AB SERPL QL IA: NONREACTIVE

## 2019-04-15 DIAGNOSIS — R79.89 ELEVATED LFTS: ICD-10-CM

## 2019-04-15 DIAGNOSIS — R11.10 VOMITING AND DIARRHEA: ICD-10-CM

## 2019-04-15 DIAGNOSIS — R19.7 VOMITING AND DIARRHEA: ICD-10-CM

## 2019-04-15 DIAGNOSIS — A08.4 VIRAL GASTROENTERITIS: ICD-10-CM

## 2019-04-15 PROCEDURE — 36415 COLL VENOUS BLD VENIPUNCTURE: CPT | Performed by: FAMILY MEDICINE

## 2019-04-15 PROCEDURE — 80053 COMPREHEN METABOLIC PANEL: CPT | Performed by: FAMILY MEDICINE

## 2019-04-15 PROCEDURE — 86708 HEPATITIS A ANTIBODY: CPT | Performed by: FAMILY MEDICINE

## 2019-04-16 LAB
ALBUMIN SERPL-MCNC: 3.7 G/DL (ref 3.4–5)
ALP SERPL-CCNC: 86 U/L (ref 40–150)
ALT SERPL W P-5'-P-CCNC: 74 U/L (ref 0–50)
ANION GAP SERPL CALCULATED.3IONS-SCNC: 9 MMOL/L (ref 3–14)
AST SERPL W P-5'-P-CCNC: 28 U/L (ref 0–45)
BILIRUB SERPL-MCNC: 0.2 MG/DL (ref 0.2–1.3)
BUN SERPL-MCNC: 7 MG/DL (ref 7–30)
CALCIUM SERPL-MCNC: 8.7 MG/DL (ref 8.5–10.1)
CHLORIDE SERPL-SCNC: 109 MMOL/L (ref 94–109)
CO2 SERPL-SCNC: 24 MMOL/L (ref 20–32)
CREAT SERPL-MCNC: 0.71 MG/DL (ref 0.52–1.04)
GFR SERPL CREATININE-BSD FRML MDRD: >90 ML/MIN/{1.73_M2}
GLUCOSE SERPL-MCNC: 91 MG/DL (ref 70–99)
HAV IGG SER QL IA: NONREACTIVE
POTASSIUM SERPL-SCNC: 3.4 MMOL/L (ref 3.4–5.3)
PROT SERPL-MCNC: 7.1 G/DL (ref 6.8–8.8)
SODIUM SERPL-SCNC: 142 MMOL/L (ref 133–144)

## 2019-07-22 ENCOUNTER — OFFICE VISIT (OUTPATIENT)
Dept: PEDIATRICS | Facility: CLINIC | Age: 39
End: 2019-07-22
Payer: COMMERCIAL

## 2019-07-22 VITALS
BODY MASS INDEX: 42 KG/M2 | OXYGEN SATURATION: 98 % | SYSTOLIC BLOOD PRESSURE: 112 MMHG | HEART RATE: 78 BPM | DIASTOLIC BLOOD PRESSURE: 74 MMHG | HEIGHT: 65 IN | WEIGHT: 252.1 LBS | TEMPERATURE: 98.4 F

## 2019-07-22 DIAGNOSIS — E66.01 MORBID OBESITY WITH BMI OF 40.0-44.9, ADULT (H): ICD-10-CM

## 2019-07-22 DIAGNOSIS — Z13.6 CARDIOVASCULAR SCREENING; LDL GOAL LESS THAN 160: ICD-10-CM

## 2019-07-22 DIAGNOSIS — F32.1 MODERATE MAJOR DEPRESSION (H): ICD-10-CM

## 2019-07-22 DIAGNOSIS — R74.8 ELEVATED LIVER ENZYMES: ICD-10-CM

## 2019-07-22 DIAGNOSIS — Z00.00 ROUTINE GENERAL MEDICAL EXAMINATION AT A HEALTH CARE FACILITY: Primary | ICD-10-CM

## 2019-07-22 DIAGNOSIS — Z12.4 CERVICAL CANCER SCREENING: ICD-10-CM

## 2019-07-22 DIAGNOSIS — Z11.3 SCREEN FOR STD (SEXUALLY TRANSMITTED DISEASE): ICD-10-CM

## 2019-07-22 DIAGNOSIS — Z90.5 S/P NEPHRECTOMY: ICD-10-CM

## 2019-07-22 DIAGNOSIS — J45.20 MILD INTERMITTENT ASTHMA WITHOUT COMPLICATION: ICD-10-CM

## 2019-07-22 DIAGNOSIS — Z90.5 H/O KIDNEY DONATION: ICD-10-CM

## 2019-07-22 LAB
ALBUMIN SERPL-MCNC: 4.1 G/DL (ref 3.4–5)
ALP SERPL-CCNC: 94 U/L (ref 40–150)
ALT SERPL W P-5'-P-CCNC: 30 U/L (ref 0–50)
ANION GAP SERPL CALCULATED.3IONS-SCNC: 7 MMOL/L (ref 3–14)
AST SERPL W P-5'-P-CCNC: 22 U/L (ref 0–45)
BILIRUB SERPL-MCNC: 0.2 MG/DL (ref 0.2–1.3)
BUN SERPL-MCNC: 9 MG/DL (ref 7–30)
C TRACH DNA SPEC QL NAA+PROBE: NORMAL
CALCIUM SERPL-MCNC: 9.1 MG/DL (ref 8.5–10.1)
CHLORIDE SERPL-SCNC: 105 MMOL/L (ref 94–109)
CO2 SERPL-SCNC: 27 MMOL/L (ref 20–32)
CREAT SERPL-MCNC: 0.76 MG/DL (ref 0.52–1.04)
GFR SERPL CREATININE-BSD FRML MDRD: >90 ML/MIN/{1.73_M2}
GLUCOSE SERPL-MCNC: 92 MG/DL (ref 70–99)
HIV 1+2 AB+HIV1 P24 AG SERPL QL IA: NONREACTIVE
N GONORRHOEA DNA SPEC QL NAA+PROBE: NORMAL
POTASSIUM SERPL-SCNC: 4.3 MMOL/L (ref 3.4–5.3)
PROT SERPL-MCNC: 7.8 G/DL (ref 6.8–8.8)
SODIUM SERPL-SCNC: 139 MMOL/L (ref 133–144)
SPECIMEN SOURCE: NORMAL
T PALLIDUM AB SER QL: NONREACTIVE
WET PREP SPEC: NORMAL

## 2019-07-22 PROCEDURE — 36415 COLL VENOUS BLD VENIPUNCTURE: CPT | Performed by: FAMILY MEDICINE

## 2019-07-22 PROCEDURE — 80053 COMPREHEN METABOLIC PANEL: CPT | Performed by: FAMILY MEDICINE

## 2019-07-22 PROCEDURE — 87210 SMEAR WET MOUNT SALINE/INK: CPT | Performed by: FAMILY MEDICINE

## 2019-07-22 PROCEDURE — 87389 HIV-1 AG W/HIV-1&-2 AB AG IA: CPT | Performed by: FAMILY MEDICINE

## 2019-07-22 PROCEDURE — 99395 PREV VISIT EST AGE 18-39: CPT | Performed by: FAMILY MEDICINE

## 2019-07-22 PROCEDURE — 86780 TREPONEMA PALLIDUM: CPT | Performed by: FAMILY MEDICINE

## 2019-07-22 RX ORDER — LORAZEPAM 0.5 MG/1
TABLET ORAL
Refills: 0 | COMMUNITY
Start: 2019-04-19 | End: 2022-10-06

## 2019-07-22 ASSESSMENT — ANXIETY QUESTIONNAIRES
5. BEING SO RESTLESS THAT IT IS HARD TO SIT STILL: NOT AT ALL
7. FEELING AFRAID AS IF SOMETHING AWFUL MIGHT HAPPEN: NOT AT ALL
6. BECOMING EASILY ANNOYED OR IRRITABLE: MORE THAN HALF THE DAYS
1. FEELING NERVOUS, ANXIOUS, OR ON EDGE: SEVERAL DAYS
GAD7 TOTAL SCORE: 3
2. NOT BEING ABLE TO STOP OR CONTROL WORRYING: NOT AT ALL
3. WORRYING TOO MUCH ABOUT DIFFERENT THINGS: NOT AT ALL

## 2019-07-22 ASSESSMENT — MIFFLIN-ST. JEOR: SCORE: 1819.4

## 2019-07-22 ASSESSMENT — PATIENT HEALTH QUESTIONNAIRE - PHQ9
SUM OF ALL RESPONSES TO PHQ QUESTIONS 1-9: 3
5. POOR APPETITE OR OVEREATING: NOT AT ALL

## 2019-07-22 NOTE — PROGRESS NOTES
SUBJECTIVE:   CC: Evan Dominguez is an 39 year old woman who presents for preventive health visit.     Healthy Habits:    Do you get at least three servings of calcium containing foods daily (dairy, green leafy vegetables, etc.)? yes    Amount of exercise or daily activities, outside of work: 3 day(s) per week    Problems taking medications regularly No    Medication side effects: No    Have you had an eye exam in the past two years? no    Do you see a dentist twice per year? yes    Do you have sleep apnea, excessive snoring or daytime drowsiness?no    QUESTIONS/ CONCERNS: Discuss STD Testing  Patient is sexually active with a new partner for the past few weeks, is interested in getting STD screening done  Has no previous history of STDs    Depression Followup    How are you doing with your depression since your last visit? No change    Are you having other symptoms that might be associated with depression? No    Have you had a significant life event?  No     Are you feeling anxious or having panic attacks?   YES    Do you have any concerns with your use of alcohol or other drugs? No    Social History     Tobacco Use     Smoking status: Never Smoker     Smokeless tobacco: Never Used   Substance Use Topics     Alcohol use: Yes     Comment:  1 drink a month before pregancy     Drug use: No     PHQ 11/5/2015 6/28/2017 6/6/2018   PHQ-9 Total Score 7 4 4   Q9: Thoughts of better off dead/self-harm past 2 weeks Not at all Not at all Not at all     TISHA-7 SCORE 1/30/2015 6/28/2017 6/6/2018   Total Score 8 - -   Total Score - 2 4     No flowsheet data found.  No flowsheet data found.  In the past two weeks have you had thoughts of suicide or self-harm?  No.    Do you have concerns about your personal safety or the safety of others?   No    Suicide Assessment Five-step Evaluation and Treatment (SAFE-T)    Asthma Follow-Up  Was ACT completed today?    Yes    ACT Total Scores 6/6/2018   ACT TOTAL SCORE -   ASTHMA ER  VISITS -   ASTHMA HOSPITALIZATIONS -   ACT TOTAL SCORE (Goal Greater than or Equal to 20) 24   In the past 12 months, how many times did you visit the emergency room for your asthma without being admitted to the hospital? 0   In the past 12 months, how many times were you hospitalized overnight because of your asthma? 0       How many days per week do you miss taking your asthma controller medication?  I do not have an asthma controller medication    Please describe any recent triggers for your asthma: None    Have you had any Emergency Room Visits, Urgent Care Visits, or Hospital Admissions since your last office visit?  No      Amount of exercise or physical activity: 2-3 days/week for an average of 15-30 minutes    Problems taking medications regularly: No    Medication side effects: none    Diet: regular (no restrictions)             Today's PHQ-2 Score:   PHQ-2 ( 1999 Pfizer) 8/6/2018 6/6/2018   Q1: Little interest or pleasure in doing things 0 0   Q2: Feeling down, depressed or hopeless 0 0   PHQ-2 Score 0 0     Abuse: Current or Past(Physical, Sexual or Emotional)- No  Do you feel safe in your environment? Yes    Social History     Tobacco Use     Smoking status: Never Smoker     Smokeless tobacco: Never Used   Substance Use Topics     Alcohol use: Yes     Comment:  1 drink a month before pregancy     If you drink alcohol do you typically have >3 drinks per day or >7 drinks per week? No                     Reviewed orders with patient.  Reviewed health maintenance and updated orders accordingly - Yes  Lab work is in process  Labs reviewed in EPIC  BP Readings from Last 3 Encounters:   07/22/19 112/74   04/12/19 128/83   08/30/18 111/74    Wt Readings from Last 3 Encounters:   07/22/19 114.4 kg (252 lb 1.6 oz)   04/12/19 114.7 kg (252 lb 14.4 oz)   08/30/18 110.9 kg (244 lb 8 oz)                  Patient Active Problem List   Diagnosis     CARDIOVASCULAR SCREENING; LDL GOAL LESS THAN 160     Asthma, exercise  induced     H/O kidney donation     Moderate major depression (H)     Meibomian gland dysfunction     Need for Tdap vaccination     Flat feet, bilateral     Nail dystrophy     Morbid obesity with BMI of 40.0-44.9, adult (H)     S/p nephrectomy, kidney donor     Adverse effect of drug, sequela, h/o dystonia     Iron deficiency anemia, unspecified iron deficiency anemia type     Past Surgical History:   Procedure Laterality Date     C NEPHRECTOMY      LT/Donated Kidney to mother     C NONSPECIFIC PROCEDURE Right     Ganglion Cyst/RT Wrist     C NONSPECIFIC PROCEDURE      laparoscopic gastric banding     C/SECTION, LOW TRANSVERSE  2013    , Low Transverse     LAPAROSCOPIC CHOLECYSTECTOMY  2014    Procedure: LAPAROSCOPIC CHOLECYSTECTOMY;  Surgeon: Cyril Hill MD;  Location:  OR       Social History     Tobacco Use     Smoking status: Never Smoker     Smokeless tobacco: Never Used   Substance Use Topics     Alcohol use: Yes     Comment:  1 drink a month before pregancy     Family History   Problem Relation Age of Onset     Diabetes Mother         Pre Diabetic     Thyroid Disease Mother      Lung Cancer Mother      Bladder Cancer Mother      Hypertension Father      Diabetes Maternal Grandmother      Heart Disease Maternal Grandfather      Diabetes Paternal Grandmother      Heart Disease Paternal Grandfather      Cancer No family hx of      Cerebrovascular Disease No family hx of      Glaucoma No family hx of      Macular Degeneration No family hx of      Skin Cancer No family hx of      Melanoma No family hx of          Current Outpatient Medications   Medication Sig Dispense Refill     albuterol (PROAIR HFA, PROVENTIL HFA, VENTOLIN HFA) 108 (90 BASE) MCG/ACT inhaler Inhale 2 puffs into the lungs every 6 hours as needed for shortness of breath / dyspnea. 1 Inhaler 1     lamoTRIgine (LAMICTAL) 100 MG tablet Take 100 mg by mouth every morning  1     levonorgestrel (MIRENA, 52  MG,) 20 MCG/24HR IUD One device, intrauterine, for up to 5 years 1 each 0     LORazepam (ATIVAN) 0.5 MG tablet TAKE 1-2 TABS BY MOUTH ONCE A DAY AS NEEDED FOR HEIGHTENED ANXIETY. FOR PERIOD 4/19/19 - 7/19/19.  0     olopatadine HCl (PATADAY) 0.2 % SOLN Place 1 drop into both eyes daily as needed (For itchy, water eyes.) 1 Bottle 6     venlafaxine (EFFEXOR-XR) 150 MG 24 hr capsule Take 150 mg by mouth daily  0     Ascorbic Acid (VITAMIN C PO)        ciclopirox (LOPROX) 0.77 % cream Apply topically 2 times daily To feet and affected toenail. (Patient not taking: Reported on 7/22/2019) 90 g 6     Allergies   Allergen Reactions     Contrast Dye      Congestion and sneezing with IVP dye     Thimerosol [Thimerosal]      Swelling and redness with eye drops     Recent Labs   Lab Test 04/15/19  1636 04/12/19  0932 06/07/18  0846 05/17/15  1019  07/08/13  0919   LDL  --   --  84  --   --  116   HDL  --   --  47*  --   --  47*   TRIG  --   --  105  --   --  128   ALT 74* 99* 21  --    < >  --    CR 0.71 0.73 0.81 0.71   < >  --    GFRESTIMATED >90 >90 79 >90  Non  GFR Calc     < >  --    GFRESTBLACK >90 >90 >90 >90  African American GFR Calc     < >  --    POTASSIUM 3.4 3.4 4.1 4.1   < >  --    TSH  --   --  2.27 1.04  --  1.70    < > = values in this interval not displayed.        Mammogram not appropriate for this patient based on age.    Pertinent mammograms are reviewed under the imaging tab.  History of abnormal Pap smear: NO - age 30- 65 PAP every 3 years recommended  PAP / HPV Latest Ref Rng & Units 6/6/2018 11/5/2015 6/25/2012   PAP - NIL NIL NIL   HPV 16 DNA NEG:Negative Negative Negative -   HPV 18 DNA NEG:Negative Negative Negative -   OTHER HR HPV NEG:Negative Negative Negative -     Reviewed and updated as needed this visit by clinical staff  Tobacco  Allergies  Meds         Reviewed and updated as needed this visit by Provider          Past Medical History:   Diagnosis Date     Abnormal Pap  smear     resolved     Asthma     Mild;  extreme cold or heavy exercises     Asymptomatic bacteriuria in pregnancy in second trimester 2015    Beta strep isolated.      Depression      Iron deficiency anemia      Migraine headache      Postpartum hypertension     no magnesium sulfate used.       Solitary kidney     S/P Donated, Left      Past Surgical History:   Procedure Laterality Date     C NEPHRECTOMY      LT/Donated Kidney to mother     C NONSPECIFIC PROCEDURE Right     Ganglion Cyst/RT Wrist     C NONSPECIFIC PROCEDURE      laparoscopic gastric banding     C/SECTION, LOW TRANSVERSE  2013    , Low Transverse     LAPAROSCOPIC CHOLECYSTECTOMY  2014    Procedure: LAPAROSCOPIC CHOLECYSTECTOMY;  Surgeon: Cyril Hill MD;  Location: [facility] OR     OB History    Para Term  AB Living   2 2 2 0 0 2   SAB TAB Ectopic Multiple Live Births   0 0 0 0 2      # Outcome Date GA Lbr Jose/2nd Weight Sex Delivery Anes PTL Lv   2 Term 09/17/15 38w3d  3.147 kg (6 lb 15 oz) M CS-LTranv   FREDERICK      Name: Samy New Term 13 38w5d  3.459 kg (7 lb 10 oz) M CS-LTranv EPI Y LIVE BIRTH      Birth Comments: System Generated. Please review and update pregnancy details.      Complications: Oligohydramnios, Postpartum hypertension, Breech presentation       ROS:  CONSTITUTIONAL: NEGATIVE for fever, chills, change in weight  INTEGUMENTARU/SKIN: NEGATIVE for worrisome rashes, moles or lesions  EYES: NEGATIVE for vision changes or irritation  ENT: NEGATIVE for ear, mouth and throat problems  RESP: NEGATIVE for significant cough or SOB  RESP:Hx asthma  BREAST: NEGATIVE for masses, tenderness or discharge  CV: NEGATIVE for chest pain, palpitations or peripheral edema  GI: NEGATIVE for nausea, abdominal pain, heartburn, or change in bowel habits  : NEGATIVE for unusual urinary or vaginal symptoms. Periods are regular.  MUSCULOSKELETAL: NEGATIVE for significant arthralgias  "or myalgia  NEURO: NEGATIVE for weakness, dizziness or paresthesias  ENDOCRINE: NEGATIVE for temperature intolerance, skin/hair changes  HEME/ALLERGY/IMMUNE: NEGATIVE for bleeding problems  PSYCHIATRIC: NEGATIVE for changes in mood or affect  PSYCHIATRIC: HX anxiety and HX depression    OBJECTIVE:   /74 (BP Location: Right arm, Patient Position: Sitting, Cuff Size: Adult Large)   Pulse 78   Temp 98.4  F (36.9  C) (Oral)   Ht 1.651 m (5' 5\")   Wt 114.4 kg (252 lb 1.6 oz)   SpO2 98%   BMI 41.95 kg/m    EXAM:  GENERAL: healthy, alert and no distress  GENERAL: Morbidly obese  EYES: Eyes grossly normal to inspection, PERRL and conjunctivae and sclerae normal  HENT: ear canals and TM's normal, nose and mouth without ulcers or lesions  NECK: no adenopathy, no asymmetry, masses, or scars and thyroid normal to palpation  RESP: lungs clear to auscultation - no rales, rhonchi or wheezes  BREAST: normal without masses, tenderness or nipple discharge and no palpable axillary masses or adenopathy  CV: regular rate and rhythm, normal S1 S2, no S3 or S4, no murmur, click or rub, no peripheral edema and peripheral pulses strong  ABDOMEN: soft, nontender, no hepatosplenomegaly, no masses and bowel sounds normal   (female): normal female external genitalia, normal urethral meatus, vaginal mucosa pink, moist, well rugated, and normal cervix/adnexa/uterus without masses or discharge  MS: no gross musculoskeletal defects noted, no edema  SKIN: no suspicious lesions or rashes  NEURO: Normal strength and tone, mentation intact and speech normal  PSYCH: mentation appears normal, affect normal/bright    Diagnostic Test Results:  Labs reviewed in Epic    ASSESSMENT/PLAN:   1. Routine general medical examination at a health care facility  Discussed on regular exercises, healthy eating, self breast exams monthly and routine dental checks.      - Wet prep  - NEISSERIA GONORRHOEA PCR  - CHLAMYDIA TRACHOMATIS PCR  - HIV Antigen " "Antibody Combo  - Treponema Abs w Reflex to RPR and Titer  - Comprehensive metabolic panel    2. Cervical cancer screening  Reviewed normal Pap from 2018, due for recheck in 2021    3. CARDIOVASCULAR SCREENING; LDL GOAL LESS THAN 160  LDL Cholesterol Calculated   Date Value Ref Range Status   06/07/2018 84 <100 mg/dL Final     Comment:     Desirable:       <100 mg/dl         4. S/p nephrectomy, kidney donor    - Comprehensive metabolic panel    5. Screen for STD (sexually transmitted disease)  Reviewed and negative hepatitis screen from 3 months ago  We will get the other STD screening today  - Wet prep  - NEISSERIA GONORRHOEA PCR  - CHLAMYDIA TRACHOMATIS PCR  - HIV Antigen Antibody Combo  - Treponema Abs w Reflex to RPR and Titer    6. Morbid obesity with BMI of 40.0-44.9, adult (H)  Wt Readings from Last 5 Encounters:   07/22/19 114.4 kg (252 lb 1.6 oz)   04/12/19 114.7 kg (252 lb 14.4 oz)   08/30/18 110.9 kg (244 lb 8 oz)   08/07/18 111.2 kg (245 lb 1.6 oz)   06/06/18 110.7 kg (244 lb 1.6 oz)     Emphasized on weight loss, portion control, low calorie and low fat diet, healthy eating, regular exercises.      7. Moderate major depression (H)  Stable on current medications, continue to follow-up with psychiatry as scheduled    8. H/O kidney donation    - Comprehensive metabolic panel    9. Mild intermittent asthma without complication  Continue with albuterol inhaler as needed    10. Elevated liver enzymes    - Comprehensive metabolic panel    COUNSELING:   Reviewed preventive health counseling, as reflected in patient instructions  Special attention given to:        Regular exercise       Healthy diet/nutrition       Vision screening       Contraception       Family planning       Folic Acid Counseling    Estimated body mass index is 41.95 kg/m  as calculated from the following:    Height as of this encounter: 1.651 m (5' 5\").    Weight as of this encounter: 114.4 kg (252 lb 1.6 oz).    Weight management plan: " Discussed healthy diet and exercise guidelines     reports that she has never smoked. She has never used smokeless tobacco.      Counseling Resources:  ATP IV Guidelines  Pooled Cohorts Equation Calculator  Breast Cancer Risk Calculator  FRAX Risk Assessment  ICSI Preventive Guidelines  Dietary Guidelines for Americans, 2010  USDA's MyPlate  ASA Prophylaxis  Lung CA Screening    Pablo Mcdowell MD  [facility]  Chart documentation done in part with Dragon Voice recognition Software. Although reviewed after completion, some word and grammatical error may remain.

## 2019-07-22 NOTE — RESULT ENCOUNTER NOTE
Dear Evan,  Your lab results showed normal liver enzymes, normal kidney function.  The vaginal test showed no concern for trichomonas, this is reassuring.  Let me know if you have any questions. Take care.  Pablo Mcdowell MD

## 2019-07-23 ASSESSMENT — ASTHMA QUESTIONNAIRES: ACT_TOTALSCORE: 25

## 2019-07-23 ASSESSMENT — ANXIETY QUESTIONNAIRES: GAD7 TOTAL SCORE: 3

## 2019-07-23 NOTE — RESULT ENCOUNTER NOTE
Keira.,  Your test results for STD screening are all negative, this is reassuring.  Let me know if you have any questions. Take care.  Pablo Mcdowell MD

## 2019-10-04 ENCOUNTER — TRANSFERRED RECORDS (OUTPATIENT)
Dept: HEALTH INFORMATION MANAGEMENT | Facility: CLINIC | Age: 39
End: 2019-10-04

## 2019-10-22 ENCOUNTER — E-VISIT (OUTPATIENT)
Dept: PEDIATRICS | Facility: CLINIC | Age: 39
End: 2019-10-22
Payer: COMMERCIAL

## 2019-10-22 DIAGNOSIS — Z11.3 SCREEN FOR STD (SEXUALLY TRANSMITTED DISEASE): Primary | ICD-10-CM

## 2019-10-22 DIAGNOSIS — Z72.51 UNPROTECTED SEX: ICD-10-CM

## 2019-10-22 PROCEDURE — 99444 ZZC PHYSICIAN ONLINE EVALUATION & MANAGEMENT SERVICE: CPT | Performed by: FAMILY MEDICINE

## 2019-11-06 ENCOUNTER — HEALTH MAINTENANCE LETTER (OUTPATIENT)
Age: 39
End: 2019-11-06

## 2020-07-28 ENCOUNTER — E-VISIT (OUTPATIENT)
Dept: PEDIATRICS | Facility: CLINIC | Age: 40
End: 2020-07-28
Payer: COMMERCIAL

## 2020-07-28 DIAGNOSIS — N76.0 BACTERIAL VAGINITIS: ICD-10-CM

## 2020-07-28 DIAGNOSIS — N89.8 VAGINAL DISCHARGE: Primary | ICD-10-CM

## 2020-07-28 DIAGNOSIS — B96.89 BACTERIAL VAGINITIS: ICD-10-CM

## 2020-07-28 DIAGNOSIS — Z11.3 SCREEN FOR STD (SEXUALLY TRANSMITTED DISEASE): ICD-10-CM

## 2020-07-28 PROCEDURE — 99421 OL DIG E/M SVC 5-10 MIN: CPT | Performed by: FAMILY MEDICINE

## 2020-07-29 NOTE — PATIENT INSTRUCTIONS
Thank you for choosing us for your care. Given your symptoms, I would like you to do a lab-only visit to determine what is causing them.  I have placed the orders.  Please schedule an appointment with the lab right here in [facility], or call [phone].  I will let you know when the results are back and next steps to take.

## 2020-08-12 ENCOUNTER — OFFICE VISIT (OUTPATIENT)
Dept: OPTOMETRY | Facility: CLINIC | Age: 40
End: 2020-08-12
Payer: COMMERCIAL

## 2020-08-12 DIAGNOSIS — Z01.00 EXAMINATION OF EYES AND VISION: Primary | ICD-10-CM

## 2020-08-12 DIAGNOSIS — H10.13 ALLERGIC CONJUNCTIVITIS OF BOTH EYES: ICD-10-CM

## 2020-08-12 DIAGNOSIS — N89.8 VAGINAL DISCHARGE: ICD-10-CM

## 2020-08-12 DIAGNOSIS — H52.13 MYOPIA OF BOTH EYES: ICD-10-CM

## 2020-08-12 DIAGNOSIS — H02.889 MEIBOMIAN GLAND DYSFUNCTION: ICD-10-CM

## 2020-08-12 DIAGNOSIS — Z11.3 SCREEN FOR STD (SEXUALLY TRANSMITTED DISEASE): ICD-10-CM

## 2020-08-12 LAB
SPECIMEN SOURCE: ABNORMAL
WET PREP SPEC: ABNORMAL

## 2020-08-12 PROCEDURE — 87340 HEPATITIS B SURFACE AG IA: CPT | Performed by: FAMILY MEDICINE

## 2020-08-12 PROCEDURE — 86803 HEPATITIS C AB TEST: CPT | Performed by: FAMILY MEDICINE

## 2020-08-12 PROCEDURE — 92015 DETERMINE REFRACTIVE STATE: CPT | Performed by: OPTOMETRIST

## 2020-08-12 PROCEDURE — 36415 COLL VENOUS BLD VENIPUNCTURE: CPT | Performed by: FAMILY MEDICINE

## 2020-08-12 PROCEDURE — 87591 N.GONORRHOEAE DNA AMP PROB: CPT | Performed by: FAMILY MEDICINE

## 2020-08-12 PROCEDURE — 92014 COMPRE OPH EXAM EST PT 1/>: CPT | Performed by: OPTOMETRIST

## 2020-08-12 PROCEDURE — 87389 HIV-1 AG W/HIV-1&-2 AB AG IA: CPT | Performed by: FAMILY MEDICINE

## 2020-08-12 PROCEDURE — 87210 SMEAR WET MOUNT SALINE/INK: CPT | Performed by: FAMILY MEDICINE

## 2020-08-12 PROCEDURE — 87491 CHLMYD TRACH DNA AMP PROBE: CPT | Performed by: FAMILY MEDICINE

## 2020-08-12 RX ORDER — METRONIDAZOLE 500 MG/1
500 TABLET ORAL 2 TIMES DAILY
Qty: 14 TABLET | Refills: 0 | Status: SHIPPED | OUTPATIENT
Start: 2020-08-12 | End: 2020-08-19

## 2020-08-12 ASSESSMENT — REFRACTION_WEARINGRX
OS_CYLINDER: SPHERE
OD_CYLINDER: SPHERE
OD_SPHERE: -1.00
OS_SPHERE: -1.00
SPECS_TYPE: SVL

## 2020-08-12 ASSESSMENT — EXTERNAL EXAM - LEFT EYE: OS_EXAM: NORMAL

## 2020-08-12 ASSESSMENT — SLIT LAMP EXAM - LIDS
COMMENTS: 1+ MEIBOMIAN GLAND DYSFUNCTION
COMMENTS: 1+ MEIBOMIAN GLAND DYSFUNCTION

## 2020-08-12 ASSESSMENT — TONOMETRY
OD_IOP_MMHG: 21
OS_IOP_MMHG: 16
IOP_METHOD: TONOPEN

## 2020-08-12 ASSESSMENT — VISUAL ACUITY
OD_CC: 20/20
OS_SC+: -1
CORRECTION_TYPE: GLASSES
METHOD: SNELLEN - LINEAR
OD_CC: 20/20-1
OS_CC: 20/20
OS_SC: 20/50
OS_CC: 20/20
OD_SC: 20/60

## 2020-08-12 ASSESSMENT — CONF VISUAL FIELD
OD_NORMAL: 1
OS_NORMAL: 1

## 2020-08-12 ASSESSMENT — REFRACTION_MANIFEST
OD_CYLINDER: SPHERE
OD_SPHERE: -1.00
OS_CYLINDER: SPHERE
OS_SPHERE: -1.00

## 2020-08-12 ASSESSMENT — CUP TO DISC RATIO
OD_RATIO: 0.2
OS_RATIO: 0.2

## 2020-08-12 ASSESSMENT — EXTERNAL EXAM - RIGHT EYE: OD_EXAM: NORMAL

## 2020-08-12 NOTE — LETTER
2020         RE: Evan Dominguez  [address]  [place] [address]        Dear Colleague,    Thank you for referring your patient, Evan Dominguez, to the [facility]. Please see a copy of my visit note below.    Chief Complaint   Patient presents with     Annual Eye Exam      Accompanied by self  Last Eye Exam: 2018  Dilated Previously: Yes    What are you currently using to see?  glasses       Distance Vision Acuity: Satisfied with vision    Near Vision Acuity: Not satisfied     Eye Comfort: itchy  Do you use eye drops? 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          Medical, surgical and family histories reviewed and updated 2020.       OBJECTIVE: See Ophthalmology exam    ASSESSMENT:    ICD-10-CM    1. Examination of eyes and vision  Z01.00    2. Myopia of both eyes  H52.13    3. Meibomian gland dysfunction  H02.889    4. Allergic conjunctivitis of both eyes  H10.13       PLAN:     Patient Instructions   Eyeglass prescription given.    Heat to the eyes daily for 10-15 minutes nightly with warm washcloth or reusable gel masks from the pharmacy or  Pixonic heat masks can be purchased at [employer].    Refresh tears 1 drop 2-4x daily.    OTC Pataday to be used once or twice daily for itchy eyes.  Use as needed.    Return in 1 year for a complete eye exam or sooner if needed.    Oz Sauceda, KALIA           Again, thank you for allowing me to participate in the care of your patient.        Sincerely,        Oz Sauceda, OD

## 2020-08-12 NOTE — PATIENT INSTRUCTIONS
Eyeglass prescription given.    Heat to the eyes daily for 10-15 minutes nightly with warm washcloth or reusable gel masks from the pharmacy or  Gucash heat masks can be purchased at [employer].    Refresh tears 1 drop 2-4x daily.    OTC Pataday to be used once or twice daily for itchy eyes.  Use as needed.    Return in 1 year for a complete eye exam or sooner if needed.    Oz Sauceda, KALIA    The affects of the dilating drops last for 4- 6 hours.  You will be more sensitive to light and vision will be blurry up close.  Mydriatic sunglasses were given if needed.      Optometry Providers       Clinic Locations                                 Telephone Number   Dr. Annamarie Louis [phone]     [facility] Hours:                [facility] Hours:       [facility] Hours:   [address]   [address] [facility] [address]     [address]  [place] [address]   [place] [address]    [place] [address]  Phone: [phone]                    Phone: [phone]     Phone: [phone]                      Monday 8:00-7:00                          Monday 8:00-7:00                          Monday 8:00-7:00              Tuesday 8:00-6:00                          Tuesday 8:00-7:00                          Tuesday 8:00-7:00              Wednesday 8:00-6:00                  Wednesday 8:00-7:00                   Wednesday 8:00-7:00      Thursday 8:00-6:00                        Thursday 8:00-7:00                         Thursday 8:00-7:00            Friday 8:00-5:00                              Friday 8:00-5:00                              Friday 8:00-5:00    [facility] Hours:   [address] [facility] [place] [id number] [phone]    Monday 8:00-7:00  Tuesday 8:00-7:00  Wednesday 8:00-7:00  Thursday 8:00-7:00  Friday 8:00-5:00  Please log on to [email or web address] to order your contact lenses.  The link is found on  the [facility] Vision [facility] page.  As always, Thank you for trusting us with your health care needs!

## 2020-08-12 NOTE — PROGRESS NOTES
Chief Complaint   Patient presents with     Annual Eye Exam      Accompanied by self  Last Eye Exam: 2018  Dilated Previously: Yes    What are you currently using to see?  glasses       Distance Vision Acuity: Satisfied with vision    Near Vision Acuity: Not satisfied     Eye Comfort: itchy  Do you use eye drops? 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          Medical, surgical and family histories reviewed and updated 2020.       OBJECTIVE: See Ophthalmology exam    ASSESSMENT:    ICD-10-CM    1. Examination of eyes and vision  Z01.00    2. Myopia of both eyes  H52.13    3. Meibomian gland dysfunction  H02.889    4. Allergic conjunctivitis of both eyes  H10.13       PLAN:     Patient Instructions   Eyeglass prescription given.    Heat to the eyes daily for 10-15 minutes nightly with warm washcloth or reusable gel masks from the pharmacy or  [employer] heat masks can be purchased at [facility]    Refresh tears 1 drop 2-4x daily.    OTC Pataday to be used once or twice daily for itchy eyes.  Use as needed.    Return in 1 year for a complete eye exam or sooner if needed.    Oz Sauceda, OD

## 2020-08-13 ENCOUNTER — MYC MEDICAL ADVICE (OUTPATIENT)
Dept: PEDIATRICS | Facility: CLINIC | Age: 40
End: 2020-08-13

## 2020-08-13 ENCOUNTER — TELEPHONE (OUTPATIENT)
Dept: PEDIATRICS | Facility: CLINIC | Age: 40
End: 2020-08-13

## 2020-08-13 DIAGNOSIS — A74.9 POSITIVE CHLAMYIDA TEST: Primary | ICD-10-CM

## 2020-08-13 LAB
C TRACH DNA SPEC QL NAA+PROBE: POSITIVE
HBV SURFACE AG SERPL QL IA: NONREACTIVE
HCV AB SERPL QL IA: NONREACTIVE
HIV 1+2 AB+HIV1 P24 AG SERPL QL IA: NONREACTIVE
N GONORRHOEA DNA SPEC QL NAA+PROBE: NEGATIVE
SPECIMEN SOURCE: ABNORMAL
SPECIMEN SOURCE: NORMAL

## 2020-08-13 RX ORDER — DOXYCYCLINE HYCLATE 100 MG
100 TABLET ORAL 2 TIMES DAILY
Qty: 14 TABLET | Refills: 0 | Status: SHIPPED | OUTPATIENT
Start: 2020-08-13 | End: 2021-01-28

## 2020-08-13 NOTE — TELEPHONE ENCOUNTER
[facility] Call Center    Phone Message    May a detailed message be left on voicemail: yes     Reason for Call: Patient called with questions regarding her medication that she was prescribed today and yesterday. Patient was prescribed the medications after her test results came back and wants to know if she should be taking both. Please advise. Thank you.    Action Taken: Message routed to:  Primary Care p [phone]    Travel Screening: Not Applicable

## 2020-08-13 NOTE — TELEPHONE ENCOUNTER
[facility] called pt has a positive lab results that had printed at there lab. Please advise on results for pt. Routed to covering provider for review..Ana JOYCE CMA

## 2020-08-13 NOTE — TELEPHONE ENCOUNTER
Please call patient   -Gonorrhea test was normal.  -Chlamydia test shows you have an infection.  ADVISE: treating with antibiotics: doxycycline : 100 mg orally twice daily for 7 days - a prescription has been sent to your pharmacy.  Also any sexual partners within the last 60 days should be assessed and treated.

## 2020-08-13 NOTE — TELEPHONE ENCOUNTER
Routing to Felicity Hubbard NP to please [facility].    Patient has bacterial infection and and Chlamydia       Becki Bell           8/13/20 4:47 PM   Note      [facility] Call Center     Phone Message     May a detailed message be left on voicemail: yes      Reason for Call: Patient called with questions regarding her medication that she was prescribed today and yesterday. Patient was prescribed the medications after her test results came back and wants to know if she should be taking both. Please advise. Thank you.     Action Taken: Message routed to:  Primary Care p [phone]     Travel Screening: Not Applicable

## 2020-08-13 NOTE — RESULT ENCOUNTER NOTE
Aram Gordon,  Your vaginal smear showed bacterial infection. I had sent a script for 7 days of medication to the pharmacy. If you don't feel any better in 1 week, make sure to follow up in the clinic.  Let me know if you have any questions. Take care.  Pablo Mcdowell MD

## 2020-08-14 NOTE — TELEPHONE ENCOUNTER
Please inform patient-   yes, needs to be on both doxycycline for chlamydia infection and metronidazole for her bacterial vaginal infection

## 2020-11-29 ENCOUNTER — HEALTH MAINTENANCE LETTER (OUTPATIENT)
Age: 40
End: 2020-11-29

## 2021-01-28 ENCOUNTER — OFFICE VISIT (OUTPATIENT)
Dept: FAMILY MEDICINE | Facility: CLINIC | Age: 41
End: 2021-01-28
Payer: COMMERCIAL

## 2021-01-28 VITALS — SYSTOLIC BLOOD PRESSURE: 122 MMHG | DIASTOLIC BLOOD PRESSURE: 72 MMHG

## 2021-01-28 DIAGNOSIS — L29.9 LOCALIZED PRURITUS: ICD-10-CM

## 2021-01-28 DIAGNOSIS — L30.1 DYSHIDROTIC ECZEMA: Primary | ICD-10-CM

## 2021-01-28 PROCEDURE — 99213 OFFICE O/P EST LOW 20 MIN: CPT | Performed by: PHYSICIAN ASSISTANT

## 2021-01-28 RX ORDER — FLUOCINONIDE 0.5 MG/G
CREAM TOPICAL 2 TIMES DAILY
Qty: 60 G | Refills: 1 | Status: SHIPPED | OUTPATIENT
Start: 2021-01-28 | End: 2022-10-06

## 2021-01-28 NOTE — PROGRESS NOTES
HPI:  Evan Dominguez is a 40 year old female patient here today for rash on hands .  Patient states this has been present for over a year.  Patient reports the following symptoms: itch, rash .  Patient reports the following previous treatments: otc steroid cream, gold bond. Patient wears gloves at work. Washes hands throughout the day.  Patient reports the following modifying factors: none.  Associated symptoms: none.  Patient has no other skin complaints today.  Remainder of the HPI, Meds, PMH, Allergies, FH, and SH was reviewed in chart.    Pertinent Hx:   rash  Past Medical History:   Diagnosis Date     Abnormal Pap smear     resolved     Asthma     Mild;  extreme cold or heavy exercises     Asymptomatic bacteriuria in pregnancy in second trimester 2015    Beta strep isolated.      Depression      Iron deficiency anemia      Migraine headache      Postpartum hypertension     no magnesium sulfate used.       Solitary kidney     S/P Donated, Left       Past Surgical History:   Procedure Laterality Date     C NEPHRECTOMY      LT/Donated Kidney to mother     C/SECTION, LOW TRANSVERSE  2013    , Low Transverse     LAPAROSCOPIC CHOLECYSTECTOMY  2014    Procedure: LAPAROSCOPIC CHOLECYSTECTOMY;  Surgeon: Cyril Hill MD;  Location: [facility] OR     ZZC NONSPECIFIC PROCEDURE Right     Ganglion Cyst/RT Wrist     ZZC NONSPECIFIC PROCEDURE      laparoscopic gastric banding        Family History   Problem Relation Age of Onset     Diabetes Mother         Pre Diabetic     Thyroid Disease Mother      Lung Cancer Mother      Bladder Cancer Mother      Cancer Mother      Hypertension Father      Diabetes Maternal Grandmother      Heart Disease Maternal Grandfather      Diabetes Paternal Grandmother      Heart Disease Paternal Grandfather      Cerebrovascular Disease No family hx of      Glaucoma No family hx of      Macular Degeneration No family hx of      Skin Cancer No  family hx of      Melanoma No family hx of        Social History     Socioeconomic History     Marital status:      Spouse name: Jeromy Mchugh     Number of children: 01     Years of education: 16     Highest education level: Not on file   Occupational History     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[employer]   Social Needs     Financial resource strain: Not on file     Food insecurity     Worry: Not on file     Inability: Not on file     Transportation needs     Medical: Not on file     Non-medical: Not on file   Tobacco Use     Smoking status: Never Smoker     Smokeless tobacco: Never Used   Substance and Sexual Activity     Alcohol use: Yes     Comment:  1 drink a month before pregancy     Drug use: No     Sexual activity: Yes     Partners: Male   Lifestyle     Physical activity     Days per week: Not on file     Minutes per session: Not on file     Stress: Not on file   Relationships     Social connections     Talks on phone: Not on file     Gets together: Not on file     Attends Nondenominational service: Not on file     Active member of club or organization: Not on file     Attends meetings of clubs or organizations: Not on file     Relationship status: Not on file     Intimate partner violence     Fear of current or ex partner: Not on file     Emotionally abused: Not on file     Physically abused: Not on file     Forced sexual activity: Not on file   Other Topics Concern     Parent/sibling w/ CABG, MI or angioplasty before 65F 55M? Not Asked      Service No     Blood Transfusions No     Caffeine Concern No     Occupational Exposure No     Hobby Hazards No     Sleep Concern No     Stress Concern No     Weight Concern Yes     Special Diet No     Back Care No     Exercise Yes     Bike Helmet Yes     Seat Belt Yes     Self-Exams Yes   Social History Narrative     Not on file       Outpatient Encounter Medications as of 1/28/2021   Medication Sig Dispense Refill     albuterol (PROAIR HFA, PROVENTIL HFA,  VENTOLIN HFA) 108 (90 BASE) MCG/ACT inhaler Inhale 2 puffs into the lungs every 6 hours as needed for shortness of breath / dyspnea. 1 Inhaler 1     lamoTRIgine (LAMICTAL) 100 MG tablet Take 100 mg by mouth every morning  1     levonorgestrel (MIRENA, 52 MG,) 20 MCG/24HR IUD One device, intrauterine, for up to 5 years 1 each 0     LORazepam (ATIVAN) 0.5 MG tablet TAKE 1-2 TABS BY MOUTH ONCE A DAY AS NEEDED FOR HEIGHTENED ANXIETY. FOR PERIOD 4/19/19 - 7/19/19.  0     olopatadine HCl (PATADAY) 0.2 % SOLN Place 1 drop into both eyes daily as needed (For itchy, water eyes.) 1 Bottle 6     venlafaxine (EFFEXOR-XR) 150 MG 24 hr capsule Take 150 mg by mouth daily  0     doxycycline hyclate (VIBRA-TABS) 100 MG tablet Take 1 tablet (100 mg) by mouth 2 times daily 14 tablet 0     No facility-administered encounter medications on file as of 1/28/2021.        Review Of Systems:  Skin: rash  Eyes: negative  Ears/Nose/Throat: negative  Respiratory: No shortness of breath, dyspnea on exertion, cough, or hemoptysis  Cardiovascular: negative  Gastrointestinal: negative  Genitourinary: negative  Musculoskeletal: negative  Neurologic: negative  Psychiatric: negative  Hematologic/Lymphatic/Immunologic: negative  Endocrine: negative      Objective:     /72   Eyes: Conjunctivae/lids: Normal   ENT: Lips:  Normal  MSK: Normal  Cardiovascular: Peripheral edema none  Pulm: Breathing Normal  Neuro/Psych: Orientation: A/O x 3 Normal; Mood/Affect: Normal, NAD, WDWN  Pt accompanied by: self  Following areas examined: face ( wearing mask), hands, wrists, forearm  Calderón skin type:ii   Findings:  Pink scaly  thickened skin with accentuated skin lines on dorsal hands  Generalized flaking of skin on palmar hands    Assessment and Plan:  1) dyshidrotic eczema  Localized pruritis  [email or web address]  Apply a thin layer of lidex to affected area on hands 2x a day for 2 weeks. Tapering with improvement. Do not use on face or body folds.  Discussed  side effects of topical steroids including but not limited to atrophy (skin thinning), striae (stretch marks) telangiectasias, steroid acne, and others. Do not apply to normal skin. Do not apply to discolored skin that does not have rash present. Educated patient on post inflammatory hyperpigmentation.   Moisturize throughout the day.   Proper skin care from [facility]:    -Eliminate harsh soaps as they strip the natural oils from the skin, often resulting in dry itchy skin ( i.e. Dial, Zest, [place])  -Use mild soaps such as Cetaphil or Dove Sensitive Skin in the shower. You do not need to use soap on arms, legs, and trunk every time you shower unless visibly soiled.   -Avoid hot or cold showers.  -After showering, lightly dry off and apply moisturizing within 2-3 minutes. This will help trap moisture in the skin.   -Aggressive use of a moisturizer at least 1-2 times a day to the entire body (including -Vanicream, Cetaphil, Aquaphor or Cerave) and moisturize hands after every washing.  -We recommend using moisturizers that come in a tub that needs to be scooped out, not a pump. This has more of an oil base. It will hold moisture in your skin much better than a water base moisturizer. The above recommended are non-pore clogging.               Follow up in 2 weeks

## 2021-01-28 NOTE — LETTER
2021         RE: Evan Dominguez  [address]  [facility] [address]        Dear Colleague,    Thank you for referring your patient, Evan Dominguez, to the [facility]. Please see a copy of my visit note below.    HPI:  Evan Dominguez is a 40 year old female patient here today for rash on hands .  Patient states this has been present for over a year.  Patient reports the following symptoms: itch, rash .  Patient reports the following previous treatments: otc steroid cream, gold bond. Patient wears gloves at work. Washes hands throughout the day.  Patient reports the following modifying factors: none.  Associated symptoms: none.  Patient has no other skin complaints today.  Remainder of the HPI, Meds, PMH, Allergies, FH, and SH was reviewed in chart.    Pertinent Hx:   rash  Past Medical History:   Diagnosis Date     Abnormal Pap smear     resolved     Asthma     Mild;  extreme cold or heavy exercises     Asymptomatic bacteriuria in pregnancy in second trimester 2015    Beta strep isolated.      Depression      Iron deficiency anemia      Migraine headache      Postpartum hypertension     no magnesium sulfate used.       Solitary kidney     S/P Donated, Left       Past Surgical History:   Procedure Laterality Date     C NEPHRECTOMY      LT/Donated Kidney to mother     C/SECTION, LOW TRANSVERSE  2013    , Low Transverse     LAPAROSCOPIC CHOLECYSTECTOMY  2014    Procedure: LAPAROSCOPIC CHOLECYSTECTOMY;  Surgeon: Cyril Hill MD;  Location: [facility] OR     ZZC NONSPECIFIC PROCEDURE Right     Ganglion Cyst/RT Wrist     ZZC NONSPECIFIC PROCEDURE      laparoscopic gastric banding        Family History   Problem Relation Age of Onset     Diabetes Mother         Pre Diabetic     Thyroid Disease Mother      Lung Cancer Mother      Bladder Cancer Mother      Cancer Mother      Hypertension Father      Diabetes Maternal  Grandmother      Heart Disease Maternal Grandfather      Diabetes Paternal Grandmother      Heart Disease Paternal Grandfather      Cerebrovascular Disease No family hx of      Glaucoma No family hx of      Macular Degeneration No family hx of      Skin Cancer No family hx of      Melanoma No family hx of        Social History     Socioeconomic History     Marital status:      Spouse name: Jeromy Mchugh     Number of children: 01     Years of education: 16     Highest education level: Not on file   Occupational History     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[employer]   Social Needs     Financial resource strain: Not on file     Food insecurity     Worry: Not on file     Inability: Not on file     Transportation needs     Medical: Not on file     Non-medical: Not on file   Tobacco Use     Smoking status: Never Smoker     Smokeless tobacco: Never Used   Substance and Sexual Activity     Alcohol use: Yes     Comment:  1 drink a month before pregancy     Drug use: No     Sexual activity: Yes     Partners: Male   Lifestyle     Physical activity     Days per week: Not on file     Minutes per session: Not on file     Stress: Not on file   Relationships     Social connections     Talks on phone: Not on file     Gets together: Not on file     Attends Pentecostal service: Not on file     Active member of club or organization: Not on file     Attends meetings of clubs or organizations: Not on file     Relationship status: Not on file     Intimate partner violence     Fear of current or ex partner: Not on file     Emotionally abused: Not on file     Physically abused: Not on file     Forced sexual activity: Not on file   Other Topics Concern     Parent/sibling w/ CABG, MI or angioplasty before 65F 55M? Not Asked      Service No     Blood Transfusions No     Caffeine Concern No     Occupational Exposure No     Hobby Hazards No     Sleep Concern No     Stress Concern No     Weight Concern Yes     Special Diet No      Back Care No     Exercise Yes     Bike Helmet Yes     Seat Belt Yes     Self-Exams Yes   Social History Narrative     Not on file       Outpatient Encounter Medications as of 1/28/2021   Medication Sig Dispense Refill     albuterol (PROAIR HFA, PROVENTIL HFA, VENTOLIN HFA) 108 (90 BASE) MCG/ACT inhaler Inhale 2 puffs into the lungs every 6 hours as needed for shortness of breath / dyspnea. 1 Inhaler 1     lamoTRIgine (LAMICTAL) 100 MG tablet Take 100 mg by mouth every morning  1     levonorgestrel (MIRENA, 52 MG,) 20 MCG/24HR IUD One device, intrauterine, for up to 5 years 1 each 0     LORazepam (ATIVAN) 0.5 MG tablet TAKE 1-2 TABS BY MOUTH ONCE A DAY AS NEEDED FOR HEIGHTENED ANXIETY. FOR PERIOD 4/19/19 - 7/19/19.  0     olopatadine HCl (PATADAY) 0.2 % SOLN Place 1 drop into both eyes daily as needed (For itchy, water eyes.) 1 Bottle 6     venlafaxine (EFFEXOR-XR) 150 MG 24 hr capsule Take 150 mg by mouth daily  0     doxycycline hyclate (VIBRA-TABS) 100 MG tablet Take 1 tablet (100 mg) by mouth 2 times daily 14 tablet 0     No facility-administered encounter medications on file as of 1/28/2021.        Review Of Systems:  Skin: rash  Eyes: negative  Ears/Nose/Throat: negative  Respiratory: No shortness of breath, dyspnea on exertion, cough, or hemoptysis  Cardiovascular: negative  Gastrointestinal: negative  Genitourinary: negative  Musculoskeletal: negative  Neurologic: negative  Psychiatric: negative  Hematologic/Lymphatic/Immunologic: negative  Endocrine: negative      Objective:     /72   Eyes: Conjunctivae/lids: Normal   ENT: Lips:  Normal  MSK: Normal  Cardiovascular: Peripheral edema none  Pulm: Breathing Normal  Neuro/Psych: Orientation: A/O x 3 Normal; Mood/Affect: Normal, NAD, WDWN  Pt accompanied by: self  Following areas examined: face ( wearing mask), hands, wrists, forearm  Calderón skin type:ii   Findings:  Pink scaly  thickened skin with accentuated skin lines on dorsal  hands  Generalized flaking of skin on palmar hands    Assessment and Plan:  1) dyshidrotic eczema  Localized pruritis  [email or web address]  Apply a thin layer of lidex to affected area on hands 2x a day for 2 weeks. Tapering with improvement. Do not use on face or body folds.  Discussed side effects of topical steroids including but not limited to atrophy (skin thinning), striae (stretch marks) telangiectasias, steroid acne, and others. Do not apply to normal skin. Do not apply to discolored skin that does not have rash present. Educated patient on post inflammatory hyperpigmentation.   Moisturize throughout the day.   Proper skin care from [facility]:    -Eliminate harsh soaps as they strip the natural oils from the skin, often resulting in dry itchy skin ( i.e. Dial, Zest, [place])  -Use mild soaps such as Cetaphil or Dove Sensitive Skin in the shower. You do not need to use soap on arms, legs, and trunk every time you shower unless visibly soiled.   -Avoid hot or cold showers.  -After showering, lightly dry off and apply moisturizing within 2-3 minutes. This will help trap moisture in the skin.   -Aggressive use of a moisturizer at least 1-2 times a day to the entire body (including -Vanicream, Cetaphil, Aquaphor or Cerave) and moisturize hands after every washing.  -We recommend using moisturizers that come in a tub that needs to be scooped out, not a pump. This has more of an oil base. It will hold moisture in your skin much better than a water base moisturizer. The above recommended are non-pore clogging.               Follow up in 2 weeks        Again, thank you for allowing me to participate in the care of your patient.        Sincerely,        Breanna Mclaughlin PA-C

## 2021-01-28 NOTE — PATIENT INSTRUCTIONS
1) dyshidrotic eczema  [email or web address]  Apply a thin layer of lidex to affected area on hands 2x a day for 2 weeks. Tapering with improvement. Do not use on face or body folds.  Discussed side effects of topical steroids including but not limited to atrophy (skin thinning), striae (stretch marks) telangiectasias, steroid acne, and others. Do not apply to normal skin. Do not apply to discolored skin that does not have rash present. Educated patient on post inflammatory hyperpigmentation.   Moisturize throughout the day.   Proper skin care from [facility]:    -Eliminate harsh soaps as they strip the natural oils from the skin, often resulting in dry itchy skin ( i.e. Dial, Zest, [place])  -Use mild soaps such as Cetaphil or Dove Sensitive Skin in the shower. You do not need to use soap on arms, legs, and trunk every time you shower unless visibly soiled.   -Avoid hot or cold showers.  -After showering, lightly dry off and apply moisturizing within 2-3 minutes. This will help trap moisture in the skin.   -Aggressive use of a moisturizer at least 1-2 times a day to the entire body (including -Vanicream, Cetaphil, Aquaphor or Cerave) and moisturize hands after every washing.  -We recommend using moisturizers that come in a tub that needs to be scooped out, not a pump. This has more of an oil base. It will hold moisture in your skin much better than a water base moisturizer. The above recommended are non-pore clogging.

## 2021-02-26 ENCOUNTER — OFFICE VISIT (OUTPATIENT)
Dept: FAMILY MEDICINE | Facility: CLINIC | Age: 41
End: 2021-02-26
Payer: COMMERCIAL

## 2021-02-26 VITALS — DIASTOLIC BLOOD PRESSURE: 82 MMHG | SYSTOLIC BLOOD PRESSURE: 112 MMHG

## 2021-02-26 DIAGNOSIS — L30.1 DYSHIDROTIC ECZEMA: ICD-10-CM

## 2021-02-26 DIAGNOSIS — L85.3 XEROSIS OF SKIN: ICD-10-CM

## 2021-02-26 DIAGNOSIS — L29.9 LOCALIZED PRURITUS: Primary | ICD-10-CM

## 2021-02-26 PROCEDURE — 99213 OFFICE O/P EST LOW 20 MIN: CPT | Performed by: PHYSICIAN ASSISTANT

## 2021-02-26 NOTE — LETTER
2021         RE: Evan Dominguez  [address]  [facility] [address]        Dear Colleague,    Thank you for referring your patient, Evan Dominguez, to the [facility]. Please see a copy of my visit note below.    HPI:  Evan Dominguez is a 41 year old female patient here today for follow up dyshidrotic eczema of hands treating with lidex BID with resolution. Pt has also been off of work for weeks due to COVID quarantine so she is not wearing gloves regularly.  Patient states this has been present for a while.  Patient reports the following symptoms: itch, rash .  Patient reports the following previous treatments:otc steroid cream, gold bond. Patient wears gloves at work.   Patient reports the following modifying factors: none.  Associated symptoms: none.  Patient has no other skin complaints today.  Remainder of the HPI, Meds, PMH, Allergies, FH, and SH was reviewed in chart.      Past Medical History:   Diagnosis Date     Abnormal Pap smear     resolved     Asthma     Mild;  extreme cold or heavy exercises     Asymptomatic bacteriuria in pregnancy in second trimester 2015    Beta strep isolated.      Depression      Iron deficiency anemia      Migraine headache      Postpartum hypertension     no magnesium sulfate used.       Solitary kidney     S/P Donated, Left       Past Surgical History:   Procedure Laterality Date     C NEPHRECTOMY      LT/Donated Kidney to mother     C/SECTION, LOW TRANSVERSE  2013    , Low Transverse     LAPAROSCOPIC CHOLECYSTECTOMY  2014    Procedure: LAPAROSCOPIC CHOLECYSTECTOMY;  Surgeon: Cyril Hill MD;  Location: [facility] OR     ZZC NONSPECIFIC PROCEDURE Right     Ganglion Cyst/RT Wrist     ZZC NONSPECIFIC PROCEDURE      laparoscopic gastric banding        Family History   Problem Relation Age of Onset     Diabetes Mother         Pre Diabetic     Thyroid Disease Mother      Lung Cancer  Mother      Bladder Cancer Mother      Cancer Mother      Hypertension Father      Diabetes Maternal Grandmother      Heart Disease Maternal Grandfather      Diabetes Paternal Grandmother      Heart Disease Paternal Grandfather      Cerebrovascular Disease No family hx of      Glaucoma No family hx of      Macular Degeneration No family hx of      Skin Cancer No family hx of      Melanoma No family hx of        Social History     Socioeconomic History     Marital status:      Spouse name: Jeromy Mchugh     Number of children: 01     Years of education: 16     Highest education level: Not on file   Occupational History     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[employer]   Social Needs     Financial resource strain: Not on file     Food insecurity     Worry: Not on file     Inability: Not on file     Transportation needs     Medical: Not on file     Non-medical: Not on file   Tobacco Use     Smoking status: Never Smoker     Smokeless tobacco: Never Used   Substance and Sexual Activity     Alcohol use: Yes     Comment:  1 drink a month before pregancy     Drug use: No     Sexual activity: Yes     Partners: Male   Lifestyle     Physical activity     Days per week: Not on file     Minutes per session: Not on file     Stress: Not on file   Relationships     Social connections     Talks on phone: Not on file     Gets together: Not on file     Attends Yazidi service: Not on file     Active member of club or organization: Not on file     Attends meetings of clubs or organizations: Not on file     Relationship status: Not on file     Intimate partner violence     Fear of current or ex partner: Not on file     Emotionally abused: Not on file     Physically abused: Not on file     Forced sexual activity: Not on file   Other Topics Concern     Parent/sibling w/ CABG, MI or angioplasty before 65F 55M? Not Asked      Service No     Blood Transfusions No     Caffeine Concern No     Occupational Exposure No      Hobby Hazards No     Sleep Concern No     Stress Concern No     Weight Concern Yes     Special Diet No     Back Care No     Exercise Yes     Bike Helmet Yes     Seat Belt Yes     Self-Exams Yes   Social History Narrative     Not on file       Outpatient Encounter Medications as of 2/26/2021   Medication Sig Dispense Refill     albuterol (PROAIR HFA, PROVENTIL HFA, VENTOLIN HFA) 108 (90 BASE) MCG/ACT inhaler Inhale 2 puffs into the lungs every 6 hours as needed for shortness of breath / dyspnea. 1 Inhaler 1     fluocinonide (LIDEX) 0.05 % external cream Apply topically 2 times daily To affected area on hands for two weeks. Do not use on face or body folds. 60 g 1     lamoTRIgine (LAMICTAL) 100 MG tablet Take 100 mg by mouth every morning  1     levonorgestrel (MIRENA, 52 MG,) 20 MCG/24HR IUD One device, intrauterine, for up to 5 years 1 each 0     LORazepam (ATIVAN) 0.5 MG tablet TAKE 1-2 TABS BY MOUTH ONCE A DAY AS NEEDED FOR HEIGHTENED ANXIETY. FOR PERIOD 4/19/19 - 7/19/19.  0     olopatadine HCl (PATADAY) 0.2 % SOLN Place 1 drop into both eyes daily as needed (For itchy, water eyes.) 1 Bottle 6     venlafaxine (EFFEXOR-XR) 150 MG 24 hr capsule Take 150 mg by mouth daily  0     No facility-administered encounter medications on file as of 2/26/2021.        Review Of Systems:  Skin: rash  Eyes: negative  Ears/Nose/Throat: negative  Respiratory: No shortness of breath, dyspnea on exertion, cough, or hemoptysis  Cardiovascular: negative  Gastrointestinal: negative  Genitourinary: negative  Musculoskeletal: negative  Neurologic: negative  Psychiatric: negative  Hematologic/Lymphatic/Immunologic: negative  Endocrine: negative      Objective:     There were no vitals taken for this visit.  Eyes: Conjunctivae/lids: Normal   ENT: Lips:  Normal  MSK: Normal  Cardiovascular: Peripheral edema none  Pulm: Breathing Normal  Neuro/Psych: Orientation: A/O x 3 Normal; Mood/Affect: Normal, NAD, WDWN  Pt accompanied by:  self  Following areas examined: face ( wearing mask), neck, hands, wrists  Calderón skin type:ii   Findings:  Generalized flaking of skin of hands    Assessment and Plan:  1) dyshidrotic eczema, xerosis of skin,  Localized pruritis  Clear today. Some dryness.  Disc etiology and course  [email or web address]  Apply a thin layer of lidex to affected area on hands 2x a day for 2 weeks. Tapering with improvement. Do not use on face or body folds.  Discussed side effects of topical steroids including but not limited to atrophy (skin thinning), striae (stretch marks) telangiectasias, steroid acne, and others. Do not apply to normal skin. Do not apply to discolored skin that does not have rash present. Educated patient on post inflammatory hyperpigmentation.   Moisturize throughout the day.   Proper skin care from [facility]:     -Eliminate harsh soaps as they strip the natural oils from the skin, often resulting in dry itchy skin ( i.e. Dial, Zest, [place])  -Use mild soaps such as Cetaphil or Dove Sensitive Skin in the shower. You do not need to use soap on arms, legs, and trunk every time you shower unless visibly soiled.   -Avoid hot or cold showers.  -After showering, lightly dry off and apply moisturizing within 2-3 minutes. This will help trap moisture in the skin.   -Aggressive use of a moisturizer at least 1-2 times a day to the entire body (including -Vanicream, Cetaphil, Aquaphor or Cerave) and moisturize hands after every washing.  -We recommend using moisturizers that come in a tub that needs to be scooped out, not a pump. This has more of an oil base. It will hold moisture in your skin much better than a water base moisturizer. The above recommended are non-pore clogging.    It was a pleasure speaking with Evan Dominguez today.   Follow up in yearly exam        Again, thank you for allowing me to participate in the care of your patient.        Sincerely,        Breanna Mclaughlin PA-C

## 2021-02-26 NOTE — PATIENT INSTRUCTIONS
Proper skin care from [facility]:    -Eliminate harsh soaps as they strip the natural oils from the skin, often resulting in dry itchy skin ( i.e. Dial, Zest, Sidra Spring)  -Use mild soaps such as Cetaphil or Dove Sensitive Skin in the shower. You do not need to use soap on arms, legs, and trunk every time you shower unless visibly soiled.   -Avoid hot or cold showers.  -After showering, lightly dry off and apply moisturizing within 2-3 minutes. This will help trap moisture in the skin.   -Aggressive use of a moisturizer at least 1-2 times a day to the entire body (including -Vanicream, Cetaphil, Aquaphor or Cerave) and moisturize hands after every washing.  -We recommend using moisturizers that come in a tub that needs to be scooped out, not a pump. This has more of an oil base. It will hold moisture in your skin much better than a water base moisturizer. The above recommended are non-pore clogging.      Wear a sunscreen with at least SPF 30 on your face, ears, neck and V of the chest daily. Wear sunscreen on other areas of the body if those areas are exposed to the sun throughout the day. Sunscreens can contain physical and/or chemical blockers. Physical blockers are less likely to clog pores, these include zinc oxide and titanium dioxide. Reapply every two hour and after swimming. Sunscreen examples include Neutrogena, CeraVe, Blue Lizard, Elta MD and many others.    UV radiation  UVA radiation remains constant throughout the day and throughout the year. It is a longer wavelength than UVB and therefore penetrates deeper into the skin leading to immediate and delayed tanning, photoaging, and skin cancer. 70-80% of UVA and UVB radiation occurs between the hours of 10am-2pm.  UVB radiation  UVB radiation causes the most harmful effects and is more significant during the summer months. However, snow and ice can reflect UVB radiation leading to skin damage during the winter months as well. UVB radiation is  responsible for tanning, burning, inflammation, delayed erythema (pinkness), pigmentation (brown spots), and skin cancer.     I recommend self monthly full body exams and yearly full body exams with a dermatology provider. If you develop a new or changing lesion please follow up for examination. Most skin cancers are pink and scaly or pink and pearly. However, we do see blue/brown/black skin cancers.  Consider the ABCDEs of melanoma when giving yourself your monthly full body exam ( don't forget the groin, buttocks, feet, toes, etc). A-asymmetry, B-borders, C-color, D-diameter, E-elevation or evolving. If you see any of these changes please follow up in clinic. If you cannot see your back I recommend purchasing a hand held mirror to use with a larger wall mirror.          1) dyshidrotic eczema  Localized pruritis  [email or web address]  Apply a thin layer of lidex to affected area on hands 2x a day for 2 weeks. Tapering with improvement. Do not use on face or body folds.  Discussed side effects of topical steroids including but not limited to atrophy (skin thinning), striae (stretch marks) telangiectasias, steroid acne, and others. Do not apply to normal skin. Do not apply to discolored skin that does not have rash present. Educated patient on post inflammatory hyperpigmentation.   Moisturize throughout the day.   Proper skin care from [facility]:     -Eliminate harsh soaps as they strip the natural oils from the skin, often resulting in dry itchy skin ( i.e. Dial, Zest, [place] Spring)  -Use mild soaps such as Cetaphil or Dove Sensitive Skin in the shower. You do not need to use soap on arms, legs, and trunk every time you shower unless visibly soiled.   -Avoid hot or cold showers.  -After showering, lightly dry off and apply moisturizing within 2-3 minutes. This will help trap moisture in the skin.   -Aggressive use of a moisturizer at least 1-2 times a day to the entire body (including -Vanicream, Cetaphil,  Aquaphor or Cerave) and moisturize hands after every washing.  -We recommend using moisturizers that come in a tub that needs to be scooped out, not a pump. This has more of an oil base. It will hold moisture in your skin much better than a water base moisturizer. The above recommended are non-pore clogging.       Okay to call for refills of lidex for the year.

## 2021-02-26 NOTE — PROGRESS NOTES
HPI:  Evan Dominguez is a 41 year old female patient here today for follow up dyshidrotic eczema of hands treating with lidex BID with resolution. Pt has also been off of work for weeks due to COVID quarantine so she is not wearing gloves regularly.  Patient states this has been present for a while.  Patient reports the following symptoms: itch, rash .  Patient reports the following previous treatments:otc steroid cream, gold bond. Patient wears gloves at work.   Patient reports the following modifying factors: none.  Associated symptoms: none.  Patient has no other skin complaints today.  Remainder of the HPI, Meds, PMH, Allergies, FH, and SH was reviewed in chart.      Past Medical History:   Diagnosis Date     Abnormal Pap smear     resolved     Asthma     Mild;  extreme cold or heavy exercises     Asymptomatic bacteriuria in pregnancy in second trimester 2015    Beta strep isolated.      Depression      Iron deficiency anemia      Migraine headache      Postpartum hypertension     no magnesium sulfate used.       Solitary kidney     S/P Donated, Left       Past Surgical History:   Procedure Laterality Date     C NEPHRECTOMY      LT/Donated Kidney to mother     C/SECTION, LOW TRANSVERSE  2013    , Low Transverse     LAPAROSCOPIC CHOLECYSTECTOMY  2014    Procedure: LAPAROSCOPIC CHOLECYSTECTOMY;  Surgeon: Cyril Hill MD;  Location: [facility] OR     ZZC NONSPECIFIC PROCEDURE Right     Ganglion Cyst/RT Wrist     ZZC NONSPECIFIC PROCEDURE      laparoscopic gastric banding        Family History   Problem Relation Age of Onset     Diabetes Mother         Pre Diabetic     Thyroid Disease Mother      Lung Cancer Mother      Bladder Cancer Mother      Cancer Mother      Hypertension Father      Diabetes Maternal Grandmother      Heart Disease Maternal Grandfather      Diabetes Paternal Grandmother      Heart Disease Paternal Grandfather      Cerebrovascular  Disease No family hx of      Glaucoma No family hx of      Macular Degeneration No family hx of      Skin Cancer No family hx of      Melanoma No family hx of        Social History     Socioeconomic History     Marital status:      Spouse name: Jeromy Mchugh     Number of children: 01     Years of education: 16     Highest education level: Not on file   Occupational History     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[employer]   Social Needs     Financial resource strain: Not on file     Food insecurity     Worry: Not on file     Inability: Not on file     Transportation needs     Medical: Not on file     Non-medical: Not on file   Tobacco Use     Smoking status: Never Smoker     Smokeless tobacco: Never Used   Substance and Sexual Activity     Alcohol use: Yes     Comment:  1 drink a month before pregancy     Drug use: No     Sexual activity: Yes     Partners: Male   Lifestyle     Physical activity     Days per week: Not on file     Minutes per session: Not on file     Stress: Not on file   Relationships     Social connections     Talks on phone: Not on file     Gets together: Not on file     Attends Islam service: Not on file     Active member of club or organization: Not on file     Attends meetings of clubs or organizations: Not on file     Relationship status: Not on file     Intimate partner violence     Fear of current or ex partner: Not on file     Emotionally abused: Not on file     Physically abused: Not on file     Forced sexual activity: Not on file   Other Topics Concern     Parent/sibling w/ CABG, MI or angioplasty before 65F 55M? Not Asked      Service No     Blood Transfusions No     Caffeine Concern No     Occupational Exposure No     Hobby Hazards No     Sleep Concern No     Stress Concern No     Weight Concern Yes     Special Diet No     Back Care No     Exercise Yes     Bike Helmet Yes     Seat Belt Yes     Self-Exams Yes   Social History Narrative     Not on file        Outpatient Encounter Medications as of 2/26/2021   Medication Sig Dispense Refill     albuterol (PROAIR HFA, PROVENTIL HFA, VENTOLIN HFA) 108 (90 BASE) MCG/ACT inhaler Inhale 2 puffs into the lungs every 6 hours as needed for shortness of breath / dyspnea. 1 Inhaler 1     fluocinonide (LIDEX) 0.05 % external cream Apply topically 2 times daily To affected area on hands for two weeks. Do not use on face or body folds. 60 g 1     lamoTRIgine (LAMICTAL) 100 MG tablet Take 100 mg by mouth every morning  1     levonorgestrel (MIRENA, 52 MG,) 20 MCG/24HR IUD One device, intrauterine, for up to 5 years 1 each 0     LORazepam (ATIVAN) 0.5 MG tablet TAKE 1-2 TABS BY MOUTH ONCE A DAY AS NEEDED FOR HEIGHTENED ANXIETY. FOR PERIOD 4/19/19 - 7/19/19.  0     olopatadine HCl (PATADAY) 0.2 % SOLN Place 1 drop into both eyes daily as needed (For itchy, water eyes.) 1 Bottle 6     venlafaxine (EFFEXOR-XR) 150 MG 24 hr capsule Take 150 mg by mouth daily  0     No facility-administered encounter medications on file as of 2/26/2021.        Review Of Systems:  Skin: rash  Eyes: negative  Ears/Nose/Throat: negative  Respiratory: No shortness of breath, dyspnea on exertion, cough, or hemoptysis  Cardiovascular: negative  Gastrointestinal: negative  Genitourinary: negative  Musculoskeletal: negative  Neurologic: negative  Psychiatric: negative  Hematologic/Lymphatic/Immunologic: negative  Endocrine: negative      Objective:     There were no vitals taken for this visit.  Eyes: Conjunctivae/lids: Normal   ENT: Lips:  Normal  MSK: Normal  Cardiovascular: Peripheral edema none  Pulm: Breathing Normal  Neuro/Psych: Orientation: A/O x 3 Normal; Mood/Affect: Normal, NAD, WDWN  Pt accompanied by: self  Following areas examined: face ( wearing mask), neck, hands, wrists  Calderón skin type:ii   Findings:  Generalized flaking of skin of hands    Assessment and Plan:  1) dyshidrotic eczema, xerosis of skin,  Localized pruritis  Clear today.  Some dryness.  Disc etiology and course  [email or web address]  Apply a thin layer of lidex to affected area on hands 2x a day for 2 weeks. Tapering with improvement. Do not use on face or body folds.  Discussed side effects of topical steroids including but not limited to atrophy (skin thinning), striae (stretch marks) telangiectasias, steroid acne, and others. Do not apply to normal skin. Do not apply to discolored skin that does not have rash present. Educated patient on post inflammatory hyperpigmentation.   Moisturize throughout the day.   Proper skin care from [facility]:     -Eliminate harsh soaps as they strip the natural oils from the skin, often resulting in dry itchy skin ( i.e. Dial, Zest, Sidra Spring)  -Use mild soaps such as Cetaphil or Dove Sensitive Skin in the shower. You do not need to use soap on arms, legs, and trunk every time you shower unless visibly soiled.   -Avoid hot or cold showers.  -After showering, lightly dry off and apply moisturizing within 2-3 minutes. This will help trap moisture in the skin.   -Aggressive use of a moisturizer at least 1-2 times a day to the entire body (including -Vanicream, Cetaphil, Aquaphor or Cerave) and moisturize hands after every washing.  -We recommend using moisturizers that come in a tub that needs to be scooped out, not a pump. This has more of an oil base. It will hold moisture in your skin much better than a water base moisturizer. The above recommended are non-pore clogging.    It was a pleasure speaking with Evan Dominguez today.   Follow up in yearly exam

## 2021-08-28 ENCOUNTER — MYC MEDICAL ADVICE (OUTPATIENT)
Dept: FAMILY MEDICINE | Facility: CLINIC | Age: 41
End: 2021-08-28

## 2021-09-19 ENCOUNTER — HEALTH MAINTENANCE LETTER (OUTPATIENT)
Age: 41
End: 2021-09-19

## 2022-01-09 ENCOUNTER — HEALTH MAINTENANCE LETTER (OUTPATIENT)
Age: 42
End: 2022-01-09

## 2022-07-04 ENCOUNTER — OFFICE VISIT (OUTPATIENT)
Dept: URGENT CARE | Facility: URGENT CARE | Age: 42
End: 2022-07-04
Payer: COMMERCIAL

## 2022-07-04 VITALS
BODY MASS INDEX: 40.8 KG/M2 | WEIGHT: 245.2 LBS | HEART RATE: 99 BPM | SYSTOLIC BLOOD PRESSURE: 128 MMHG | DIASTOLIC BLOOD PRESSURE: 85 MMHG | TEMPERATURE: 97.2 F | OXYGEN SATURATION: 98 % | RESPIRATION RATE: 18 BRPM

## 2022-07-04 DIAGNOSIS — G43.901 MIGRAINE WITH STATUS MIGRAINOSUS, NOT INTRACTABLE, UNSPECIFIED MIGRAINE TYPE: ICD-10-CM

## 2022-07-04 DIAGNOSIS — R11.0 NAUSEA: ICD-10-CM

## 2022-07-04 DIAGNOSIS — G44.209 TENSION HEADACHE: Primary | ICD-10-CM

## 2022-07-04 PROCEDURE — 99213 OFFICE O/P EST LOW 20 MIN: CPT | Performed by: PHYSICIAN ASSISTANT

## 2022-07-04 RX ORDER — ONDANSETRON 4 MG/1
4 TABLET, ORALLY DISINTEGRATING ORAL EVERY 8 HOURS PRN
Qty: 20 TABLET | Refills: 0 | Status: SHIPPED | OUTPATIENT
Start: 2022-07-04 | End: 2022-10-06

## 2022-07-04 RX ORDER — TRAMADOL HYDROCHLORIDE 50 MG/1
50 TABLET ORAL EVERY 6 HOURS PRN
Qty: 10 TABLET | Refills: 0 | Status: SHIPPED | OUTPATIENT
Start: 2022-07-04 | End: 2022-07-07

## 2022-07-04 RX ORDER — VORTIOXETINE 20 MG/1
20 TABLET, FILM COATED ORAL EVERY MORNING
COMMUNITY
Start: 2022-05-16

## 2022-07-04 RX ORDER — BUSPIRONE HYDROCHLORIDE 15 MG/1
TABLET ORAL DAILY
COMMUNITY
Start: 2022-02-16

## 2022-07-04 NOTE — PATIENT INSTRUCTIONS
Tramadol 50mg every 6 hours as needed  Tylenol (acetaminophen) 1000mg every 6-8 hours  Benadryl (diphenhydramine) 50mg   Magnesium tablet OTC  Drink a full glass of water and take a nap in a cool dark room  Add Zofran (ondansetron) every 6-8 hours if nauseated  Consider Imitrex (sumatriptan) 50-100mg at onset of headache. May repeat in 2 hours if headache is persistent. No more than 200mg in 24 hours    If symptoms are not improved in 24 hours, go to ED as you may need IV meds.

## 2022-07-04 NOTE — PROGRESS NOTES
"Assessment & Plan     Tension headache  - ondansetron (ZOFRAN ODT) 4 MG ODT tab; Take 1 tablet (4 mg) by mouth every 8 hours as needed for nausea  - traMADol (ULTRAM) 50 MG tablet; Take 1 tablet (50 mg) by mouth every 6 hours as needed for severe pain    Migraine with status migrainosus, not intractable, unspecified migraine type  - ondansetron (ZOFRAN ODT) 4 MG ODT tab; Take 1 tablet (4 mg) by mouth every 8 hours as needed for nausea  - traMADol (ULTRAM) 50 MG tablet; Take 1 tablet (50 mg) by mouth every 6 hours as needed for severe pain    Nausea  - ondansetron (ZOFRAN ODT) 4 MG ODT tab; Take 1 tablet (4 mg) by mouth every 8 hours as needed for nausea    We discussed migraine abortive treatment at home.  If symptoms worsen or do not improve in 24 hours, follow-up in emergency room as she may require IV medications to abort the headache.    20 minutes spent on the date of the encounter doing chart review, patient visit, and documentation     Return in about 1 day (around 7/5/2022) for ED if not improving.     Danielle Loving PA-C  [facility]    Subjective   Evan Dominguez is a 42 year old who presents for the following health issues   HPI    Headache    Onset of symptoms was 2week(s).  Course of illness is worsening  Severity moderate  Character of pain: \"helmet of pain\"  Current and associated symptoms: nausea. Is not experiencing visual disturbances, aphasia and mental status changes  Location of pain: left-sided temporal but also global  Prodromal sx?:  No  History of Migranes: Yes  Is headache similar to previous: Yes  Predisposing factors: None  Treatment and measures tried: Tylenol and Ibuprofen    Evan presents to clinic today for evaluation of a headache.  She states states symptoms began first about 2 weeks ago with a \"low level headache\".  After a week, she went to the chiropractor and this seemed like it helped for short period of time but then the headache came back. "  She did see the chiropractor again last Thursday, 4 days ago, and once again her symptoms seem like they improved for short period of time however she did call into work because she was not able to work on Friday.  Last night, symptoms got much worse and she felt awful.  She was nauseated and had pain up the back of her neck and felt that she had a helmet of pain.  She does have more significant pain in the left frontal area.  She took Tylenol and ibuprofen and slept but woke up this morning with back pain once again.  She has had migraines in the past and this feels similar to previous migraine but also more pain in the back of her head than with general migraine.  She rates her pain today as a 7 out of 10.  She has not had any visual changes.    Review of Systems   ROS negative except as stated above.      Objective    /85 (BP Location: Left arm, Patient Position: Sitting, Cuff Size: Adult Large)   Pulse 99   Temp 97.2  F (36.2  C) (Tympanic)   Resp 18   Wt 111.2 kg (245 lb 3.2 oz)   SpO2 98%   BMI 40.80 kg/m    Physical Exam   GENERAL: healthy, alert and no distress  EYES: Eyes grossly normal to inspection, PERRL and conjunctivae and sclerae normal  HENT: ear canals and TM's normal, nose and mouth without ulcers or lesions  NECK: no adenopathy, no asymmetry, masses, or scars and thyroid normal to palpation  RESP: lungs clear to auscultation - no rales, rhonchi or wheezes  CV: regular rate and rhythm, normal S1 S2, no S3 or S4, no murmur, click or rub, no peripheral edema   MS: no gross musculoskeletal defects noted, no edema  NEURO: Normal strength and tone, mentation intact and speech normal cranial nerves II through XII intact..   PSYCH: mentation appears normal, affect normal/bright    No results found for any visits on 07/04/22.

## 2022-10-06 ENCOUNTER — OFFICE VISIT (OUTPATIENT)
Dept: FAMILY MEDICINE | Facility: CLINIC | Age: 42
End: 2022-10-06
Payer: COMMERCIAL

## 2022-10-06 VITALS
SYSTOLIC BLOOD PRESSURE: 110 MMHG | WEIGHT: 251 LBS | BODY MASS INDEX: 41.82 KG/M2 | DIASTOLIC BLOOD PRESSURE: 80 MMHG | OXYGEN SATURATION: 97 % | TEMPERATURE: 97 F | HEART RATE: 77 BPM | HEIGHT: 65 IN

## 2022-10-06 DIAGNOSIS — Z13.0 SCREENING FOR DEFICIENCY ANEMIA: ICD-10-CM

## 2022-10-06 DIAGNOSIS — Z00.00 ROUTINE GENERAL MEDICAL EXAMINATION AT A HEALTH CARE FACILITY: ICD-10-CM

## 2022-10-06 DIAGNOSIS — Z80.8 FAMILY HISTORY OF THYROID CANCER: ICD-10-CM

## 2022-10-06 DIAGNOSIS — Z13.220 SCREENING CHOLESTEROL LEVEL: ICD-10-CM

## 2022-10-06 DIAGNOSIS — Z12.4 CERVICAL CANCER SCREENING: Primary | ICD-10-CM

## 2022-10-06 DIAGNOSIS — Z12.31 ENCOUNTER FOR SCREENING MAMMOGRAM FOR MALIGNANT NEOPLASM OF BREAST: ICD-10-CM

## 2022-10-06 DIAGNOSIS — Z13.29 THYROID DISORDER SCREENING: ICD-10-CM

## 2022-10-06 DIAGNOSIS — Z13.1 SCREENING FOR DIABETES MELLITUS: ICD-10-CM

## 2022-10-06 PROCEDURE — 87624 HPV HI-RISK TYP POOLED RSLT: CPT | Performed by: NURSE PRACTITIONER

## 2022-10-06 PROCEDURE — G0145 SCR C/V CYTO,THINLAYER,RESCR: HCPCS | Performed by: NURSE PRACTITIONER

## 2022-10-06 PROCEDURE — 99213 OFFICE O/P EST LOW 20 MIN: CPT | Mod: 25 | Performed by: NURSE PRACTITIONER

## 2022-10-06 PROCEDURE — 99396 PREV VISIT EST AGE 40-64: CPT | Performed by: NURSE PRACTITIONER

## 2022-10-06 PROCEDURE — G0124 SCREEN C/V THIN LAYER BY MD: HCPCS | Performed by: PATHOLOGY

## 2022-10-06 ASSESSMENT — ASTHMA QUESTIONNAIRES
QUESTION_1 LAST FOUR WEEKS HOW MUCH OF THE TIME DID YOUR ASTHMA KEEP YOU FROM GETTING AS MUCH DONE AT WORK, SCHOOL OR AT HOME: NONE OF THE TIME
QUESTION_3 LAST FOUR WEEKS HOW OFTEN DID YOUR ASTHMA SYMPTOMS (WHEEZING, COUGHING, SHORTNESS OF BREATH, CHEST TIGHTNESS OR PAIN) WAKE YOU UP AT NIGHT OR EARLIER THAN USUAL IN THE MORNING: NOT AT ALL
QUESTION_4 LAST FOUR WEEKS HOW OFTEN HAVE YOU USED YOUR RESCUE INHALER OR NEBULIZER MEDICATION (SUCH AS ALBUTEROL): NOT AT ALL
QUESTION_5 LAST FOUR WEEKS HOW WOULD YOU RATE YOUR ASTHMA CONTROL: COMPLETELY CONTROLLED
ACT_TOTALSCORE: 25
ACT_TOTALSCORE: 25
QUESTION_2 LAST FOUR WEEKS HOW OFTEN HAVE YOU HAD SHORTNESS OF BREATH: NOT AT ALL

## 2022-10-06 ASSESSMENT — ENCOUNTER SYMPTOMS
FEVER: 0
ARTHRALGIAS: 0
EYE PAIN: 0
HEMATURIA: 0
DIARRHEA: 0
BREAST MASS: 0
CHILLS: 0
SHORTNESS OF BREATH: 0
NERVOUS/ANXIOUS: 0
DIZZINESS: 0
PARESTHESIAS: 0
NAUSEA: 0
COUGH: 0
SORE THROAT: 0
PALPITATIONS: 0
DYSURIA: 0
CONSTIPATION: 0
HEMATOCHEZIA: 0
MYALGIAS: 0
FREQUENCY: 0
ABDOMINAL PAIN: 0
WEAKNESS: 0
HEARTBURN: 0
HEADACHES: 1
JOINT SWELLING: 0

## 2022-10-06 ASSESSMENT — PATIENT HEALTH QUESTIONNAIRE - PHQ9
10. IF YOU CHECKED OFF ANY PROBLEMS, HOW DIFFICULT HAVE THESE PROBLEMS MADE IT FOR YOU TO DO YOUR WORK, TAKE CARE OF THINGS AT HOME, OR GET ALONG WITH OTHER PEOPLE: SOMEWHAT DIFFICULT
SUM OF ALL RESPONSES TO PHQ QUESTIONS 1-9: 6
SUM OF ALL RESPONSES TO PHQ QUESTIONS 1-9: 6
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
## 2022-10-10 LAB
BKR LAB AP GYN ADEQUACY: ABNORMAL
BKR LAB AP GYN INTERPRETATION: ABNORMAL
BKR LAB AP HPV REFLEX: ABNORMAL
BKR LAB AP PREVIOUS ABNORMAL: ABNORMAL
PATH REPORT.COMMENTS IMP SPEC: ABNORMAL
PATH REPORT.COMMENTS IMP SPEC: ABNORMAL
PATH REPORT.RELEVANT HX SPEC: ABNORMAL

## 2022-10-11 LAB
HUMAN PAPILLOMA VIRUS 16 DNA: NEGATIVE
HUMAN PAPILLOMA VIRUS 18 DNA: NEGATIVE
HUMAN PAPILLOMA VIRUS FINAL DIAGNOSIS: NORMAL
HUMAN PAPILLOMA VIRUS OTHER HR: NEGATIVE

## 2022-10-12 ENCOUNTER — PATIENT OUTREACH (OUTPATIENT)
Dept: FAMILY MEDICINE | Facility: CLINIC | Age: 42
End: 2022-10-12

## 2022-10-21 ENCOUNTER — LAB (OUTPATIENT)
Dept: LAB | Facility: CLINIC | Age: 42
End: 2022-10-21
Attending: NURSE PRACTITIONER
Payer: COMMERCIAL

## 2022-10-21 ENCOUNTER — HOSPITAL ENCOUNTER (OUTPATIENT)
Dept: ULTRASOUND IMAGING | Facility: CLINIC | Age: 42
Discharge: HOME | End: 2022-10-21
Attending: NURSE PRACTITIONER
Payer: COMMERCIAL

## 2022-10-21 ENCOUNTER — HOSPITAL ENCOUNTER (OUTPATIENT)
Dept: MAMMOGRAPHY | Facility: CLINIC | Age: 42
Discharge: HOME | End: 2022-10-21
Attending: NURSE PRACTITIONER
Payer: COMMERCIAL

## 2022-10-21 DIAGNOSIS — Z13.220 SCREENING CHOLESTEROL LEVEL: ICD-10-CM

## 2022-10-21 DIAGNOSIS — Z80.8 FAMILY HISTORY OF THYROID CANCER: ICD-10-CM

## 2022-10-21 DIAGNOSIS — Z13.0 SCREENING FOR DEFICIENCY ANEMIA: ICD-10-CM

## 2022-10-21 DIAGNOSIS — Z13.1 SCREENING FOR DIABETES MELLITUS: ICD-10-CM

## 2022-10-21 DIAGNOSIS — Z13.29 THYROID DISORDER SCREENING: ICD-10-CM

## 2022-10-21 DIAGNOSIS — Z12.31 ENCOUNTER FOR SCREENING MAMMOGRAM FOR MALIGNANT NEOPLASM OF BREAST: ICD-10-CM

## 2022-10-21 LAB
ALBUMIN SERPL-MCNC: 3.6 G/DL (ref 3.4–5)
ALP SERPL-CCNC: 92 U/L (ref 40–150)
ALT SERPL W P-5'-P-CCNC: 41 U/L (ref 0–50)
ANION GAP SERPL CALCULATED.3IONS-SCNC: 7 MMOL/L (ref 3–14)
AST SERPL W P-5'-P-CCNC: 17 U/L (ref 0–45)
BILIRUB SERPL-MCNC: 0.4 MG/DL (ref 0.2–1.3)
BUN SERPL-MCNC: 13 MG/DL (ref 7–30)
CALCIUM SERPL-MCNC: 8.9 MG/DL (ref 8.5–10.1)
CHLORIDE BLD-SCNC: 106 MMOL/L (ref 94–109)
CHOLEST SERPL-MCNC: 157 MG/DL
CO2 SERPL-SCNC: 24 MMOL/L (ref 20–32)
CREAT SERPL-MCNC: 0.9 MG/DL (ref 0.52–1.04)
ERYTHROCYTE [DISTWIDTH] IN BLOOD BY AUTOMATED COUNT: 12.6 % (ref 10–15)
FASTING STATUS PATIENT QL REPORTED: YES
GFR SERPL CREATININE-BSD FRML MDRD: 81 ML/MIN/1.73M2
GLUCOSE BLD-MCNC: 94 MG/DL (ref 70–99)
HCT VFR BLD AUTO: 39.8 % (ref 35–47)
HDLC SERPL-MCNC: 36 MG/DL
HGB BLD-MCNC: 13.4 G/DL (ref 11.7–15.7)
LDLC SERPL CALC-MCNC: 90 MG/DL
MCH RBC QN AUTO: 28.6 PG (ref 26.5–33)
MCHC RBC AUTO-ENTMCNC: 33.7 G/DL (ref 31.5–36.5)
MCV RBC AUTO: 85 FL (ref 78–100)
NONHDLC SERPL-MCNC: 121 MG/DL
PLATELET # BLD AUTO: 289 10E3/UL (ref 150–450)
POTASSIUM BLD-SCNC: 4.1 MMOL/L (ref 3.4–5.3)
PROT SERPL-MCNC: 7.7 G/DL (ref 6.8–8.8)
RBC # BLD AUTO: 4.69 10E6/UL (ref 3.8–5.2)
SODIUM SERPL-SCNC: 137 MMOL/L (ref 133–144)
TRIGL SERPL-MCNC: 156 MG/DL
TSH SERPL DL<=0.005 MIU/L-ACNC: 1.47 MU/L (ref 0.4–4)
WBC # BLD AUTO: 9.8 10E3/UL (ref 4–11)

## 2022-10-21 PROCEDURE — 84443 ASSAY THYROID STIM HORMONE: CPT

## 2022-10-21 PROCEDURE — 36415 COLL VENOUS BLD VENIPUNCTURE: CPT

## 2022-10-21 PROCEDURE — 76536 US EXAM OF HEAD AND NECK: CPT

## 2022-10-21 PROCEDURE — 85027 COMPLETE CBC AUTOMATED: CPT

## 2022-10-21 PROCEDURE — 80053 COMPREHEN METABOLIC PANEL: CPT

## 2022-10-21 PROCEDURE — 80061 LIPID PANEL: CPT

## 2022-10-21 PROCEDURE — 77067 SCR MAMMO BI INCL CAD: CPT

## 2023-09-06 ENCOUNTER — PATIENT OUTREACH (OUTPATIENT)
Dept: CARE COORDINATION | Facility: CLINIC | Age: 43
End: 2023-09-06
Payer: COMMERCIAL

## 2023-09-20 ENCOUNTER — PATIENT OUTREACH (OUTPATIENT)
Dept: CARE COORDINATION | Facility: CLINIC | Age: 43
End: 2023-09-20
Payer: COMMERCIAL

## 2023-09-21 ENCOUNTER — PATIENT OUTREACH (OUTPATIENT)
Dept: FAMILY MEDICINE | Facility: CLINIC | Age: 43
End: 2023-09-21
Payer: COMMERCIAL

## 2023-09-21 DIAGNOSIS — R87.612 PAPANICOLAOU SMEAR OF CERVIX WITH LOW GRADE SQUAMOUS INTRAEPITHELIAL LESION (LGSIL): Primary | ICD-10-CM

## 2023-11-13 NOTE — TELEPHONE ENCOUNTER
FYI to provider - Patient is lost to pap tracking follow-up. Attempts to contact pt have been made per reminder process and there has been no reply and/or no appt scheduled. Contact hx listed below.     03/24/06 [facility] Pap, + HR HPV  04/28/06 [place] [facility]   01/10/07 [facility] Pap Neg HR HPV    11/01/07 NIL Pap  2008. 2009, 2010 NIL Pap's  07/25/11 NIL Pap, Neg HR HPV   06/25/12 NIL Pap  11/05/15 NIL Pap, Neg HR HPV   06/6/18 NIL Pap, Neg HR HPV  10/6/22 [facility] Pap, Neg HR HPV Plan cotest in 1 year due 10/6/23  9/21/23 Reminder MyChart  10/13/23 Reminder call - let message  11/13/23 Lost to follow-up for pap tracking

## 2023-11-26 ENCOUNTER — HEALTH MAINTENANCE LETTER (OUTPATIENT)
Age: 43
End: 2023-11-26

## 2025-01-04 ENCOUNTER — HEALTH MAINTENANCE LETTER (OUTPATIENT)
Age: 45
End: 2025-01-04